# Patient Record
Sex: MALE | Race: WHITE | URBAN - METROPOLITAN AREA
[De-identification: names, ages, dates, MRNs, and addresses within clinical notes are randomized per-mention and may not be internally consistent; named-entity substitution may affect disease eponyms.]

---

## 2018-06-05 ENCOUNTER — MEDICAL CORRESPONDENCE (OUTPATIENT)
Dept: HEALTH INFORMATION MANAGEMENT | Facility: CLINIC | Age: 68
End: 2018-06-05

## 2018-06-05 ENCOUNTER — TRANSFERRED RECORDS (OUTPATIENT)
Dept: HEALTH INFORMATION MANAGEMENT | Facility: CLINIC | Age: 68
End: 2018-06-05

## 2018-06-07 ENCOUNTER — TELEPHONE (OUTPATIENT)
Dept: DERMATOLOGY | Facility: CLINIC | Age: 68
End: 2018-06-07

## 2018-06-07 NOTE — TELEPHONE ENCOUNTER
[facility] Call Center    Phone Message    May a detailed message be left on voicemail: yes    Reason for Call: Other: Pt is being referred by Dr. 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 It is not adequately controlled and they are referring for further treatment options. This was sent as an urgen referral and I am not able to facilitate an urgent appt. I have fwd the referral/notes to the clinic for review. Please contact the pt with an appt. Thanks.     Action Taken: Message routed to:  [facility] ([facility] Derm

## 2018-06-07 NOTE — TELEPHONE ENCOUNTER
I called and spoke with Benji. He is going to give us a call tomorrow 6/8/18 to get scheduled for a appointment. Ok to schedule with Dr. Perez later on in the week. Referral is in my box.    MICHELLE Hamm

## 2018-06-11 ENCOUNTER — TELEPHONE (OUTPATIENT)
Dept: DERMATOLOGY | Facility: CLINIC | Age: 68
End: 2018-06-11

## 2018-06-11 NOTE — TELEPHONE ENCOUNTER
Dermatology Pre-visit Call:    Reason for visit : Sores on feet      Was the patient referred: Yes    If the patient was referred, are records obtained: Yes    Has the patient seen a dermatologist in the past: Yes     Patient Reminders Given:  --Please, make sure you bring an updated list of your medications.   --Plan on being in our facility for approximately one hour, this includes the registration process, office visit, education and check-out process.  If you are having a procedure, more time may be required.     --If you are having a procedure, please, present 15 minutes early.  --Location reviewed.   --If you need to cancel or reschedule, call XXXX  --We look forward to seeing you in Dermatology Clinic.

## 2018-06-15 ENCOUNTER — RADIANT APPOINTMENT (OUTPATIENT)
Dept: GENERAL RADIOLOGY | Facility: CLINIC | Age: 68
End: 2018-06-15
Attending: PHYSICIAN ASSISTANT
Payer: COMMERCIAL

## 2018-06-15 ENCOUNTER — OFFICE VISIT (OUTPATIENT)
Dept: DERMATOLOGY | Facility: CLINIC | Age: 68
End: 2018-06-15
Payer: COMMERCIAL

## 2018-06-15 DIAGNOSIS — D86.3 CUTANEOUS SARCOIDOSIS (H): Primary | ICD-10-CM

## 2018-06-15 DIAGNOSIS — R21 RASH: ICD-10-CM

## 2018-06-15 DIAGNOSIS — D86.3 CUTANEOUS SARCOIDOSIS (H): ICD-10-CM

## 2018-06-15 LAB
ALBUMIN SERPL-MCNC: 4 G/DL (ref 3.4–5)
ALBUMIN UR-MCNC: NEGATIVE MG/DL
ALP SERPL-CCNC: 58 U/L (ref 40–150)
ALT SERPL W P-5'-P-CCNC: 25 U/L (ref 0–70)
ANION GAP SERPL CALCULATED.3IONS-SCNC: 8 MMOL/L (ref 3–14)
APPEARANCE UR: ABNORMAL
AST SERPL W P-5'-P-CCNC: 16 U/L (ref 0–45)
BACTERIA #/AREA URNS HPF: ABNORMAL /HPF
BASOPHILS # BLD AUTO: 0 10E9/L (ref 0–0.2)
BASOPHILS NFR BLD AUTO: 0.3 %
BILIRUB SERPL-MCNC: 0.7 MG/DL (ref 0.2–1.3)
BILIRUB UR QL STRIP: NEGATIVE
BUN SERPL-MCNC: 28 MG/DL (ref 7–30)
CALCIUM SERPL-MCNC: 8.9 MG/DL (ref 8.5–10.1)
CHLORIDE SERPL-SCNC: 101 MMOL/L (ref 94–109)
CO2 SERPL-SCNC: 28 MMOL/L (ref 20–32)
COLOR UR AUTO: YELLOW
CREAT SERPL-MCNC: 1.07 MG/DL (ref 0.66–1.25)
DIFFERENTIAL METHOD BLD: ABNORMAL
EOSINOPHIL # BLD AUTO: 0 10E9/L (ref 0–0.7)
EOSINOPHIL NFR BLD AUTO: 0.1 %
ERYTHROCYTE [DISTWIDTH] IN BLOOD BY AUTOMATED COUNT: 13.8 % (ref 10–15)
GFR SERPL CREATININE-BSD FRML MDRD: 69 ML/MIN/1.7M2
GLUCOSE SERPL-MCNC: 140 MG/DL (ref 70–99)
GLUCOSE UR STRIP-MCNC: NEGATIVE MG/DL
HCT VFR BLD AUTO: 44.3 % (ref 40–53)
HGB BLD-MCNC: 14.4 G/DL (ref 13.3–17.7)
HGB UR QL STRIP: NEGATIVE
IMM GRANULOCYTES # BLD: 0.2 10E9/L (ref 0–0.4)
IMM GRANULOCYTES NFR BLD: 1.7 %
KETONES UR STRIP-MCNC: NEGATIVE MG/DL
LEUKOCYTE ESTERASE UR QL STRIP: NEGATIVE
LYMPHOCYTES # BLD AUTO: 0.6 10E9/L (ref 0.8–5.3)
LYMPHOCYTES NFR BLD AUTO: 6.9 %
MCH RBC QN AUTO: 31.6 PG (ref 26.5–33)
MCHC RBC AUTO-ENTMCNC: 32.5 G/DL (ref 31.5–36.5)
MCV RBC AUTO: 97 FL (ref 78–100)
MONOCYTES # BLD AUTO: 0.4 10E9/L (ref 0–1.3)
MONOCYTES NFR BLD AUTO: 4.3 %
MUCOUS THREADS #/AREA URNS LPF: PRESENT /LPF
NEUTROPHILS # BLD AUTO: 7.9 10E9/L (ref 1.6–8.3)
NEUTROPHILS NFR BLD AUTO: 86.7 %
NITRATE UR QL: NEGATIVE
NRBC # BLD AUTO: 0 10*3/UL
NRBC BLD AUTO-RTO: 0 /100
PH UR STRIP: 5 PH (ref 5–7)
PLATELET # BLD AUTO: 210 10E9/L (ref 150–450)
POTASSIUM SERPL-SCNC: 4.1 MMOL/L (ref 3.4–5.3)
PROT SERPL-MCNC: 7.7 G/DL (ref 6.8–8.8)
RBC # BLD AUTO: 4.55 10E12/L (ref 4.4–5.9)
RBC #/AREA URNS AUTO: 2 /HPF (ref 0–2)
SODIUM SERPL-SCNC: 137 MMOL/L (ref 133–144)
SOURCE: ABNORMAL
SP GR UR STRIP: 1.02 (ref 1–1.03)
SPERM #/AREA URNS HPF: PRESENT /HPF
UROBILINOGEN UR STRIP-MCNC: 0 MG/DL (ref 0–2)
WBC # BLD AUTO: 9.1 10E9/L (ref 4–11)
WBC #/AREA URNS AUTO: 2 /HPF (ref 0–5)

## 2018-06-15 RX ORDER — TRIAMCINOLONE ACETONIDE 1 MG/G
CREAM TOPICAL
COMMUNITY
Start: 2018-01-24 | End: 2019-02-15

## 2018-06-15 RX ORDER — PREDNISONE 5 MG/1
15 TABLET ORAL
COMMUNITY
Start: 2018-05-29 | End: 2018-08-21

## 2018-06-15 RX ORDER — NITROGLYCERIN 0.4 MG/1
0.4 TABLET SUBLINGUAL
COMMUNITY
Start: 2017-07-11

## 2018-06-15 RX ORDER — TACROLIMUS 1 MG/G
OINTMENT TOPICAL 2 TIMES DAILY
Qty: 60 G | Refills: 0 | Status: SHIPPED | OUTPATIENT
Start: 2018-06-15 | End: 2018-07-20

## 2018-06-15 RX ORDER — LISINOPRIL 10 MG/1
TABLET ORAL
COMMUNITY
Start: 2018-03-21

## 2018-06-15 RX ORDER — SODIUM PHOSPHATE,MONO-DIBASIC 19G-7G/118
2 ENEMA (ML) RECTAL
COMMUNITY
Start: 2014-12-17

## 2018-06-15 RX ORDER — FOLIC ACID 1 MG/1
TABLET ORAL
COMMUNITY
Start: 2017-09-15 | End: 2019-07-30

## 2018-06-15 RX ORDER — LIDOCAINE HYDROCHLORIDE AND EPINEPHRINE 10; 10 MG/ML; UG/ML
3 INJECTION, SOLUTION INFILTRATION; PERINEURAL ONCE
Qty: 3 ML | Refills: 0 | OUTPATIENT
Start: 2018-06-15 | End: 2019-02-15

## 2018-06-15 RX ORDER — METOPROLOL TARTRATE 25 MG/1
TABLET, FILM COATED ORAL
COMMUNITY
Start: 2018-05-25

## 2018-06-15 RX ORDER — METHOTREXATE 2.5 MG/1
TABLET ORAL
COMMUNITY
Start: 2018-05-09 | End: 2018-08-21

## 2018-06-15 RX ORDER — BETAMETHASONE DIPROPIONATE 0.05 %
OINTMENT (GRAM) TOPICAL 2 TIMES DAILY
Qty: 50 G | Refills: 3 | Status: SHIPPED | OUTPATIENT
Start: 2018-06-15 | End: 2018-08-21

## 2018-06-15 ASSESSMENT — PAIN SCALES - GENERAL
PAINLEVEL: NO PAIN (0)
PAINLEVEL: MILD PAIN (2)

## 2018-06-15 NOTE — NURSING NOTE
Lidocaine 1-1:276656 % injection   2mL once for one use, starting 6/15/2018 ending 6/15/2018,  2mL disp, R-0, injection  Injected by Shani Marshall LPN

## 2018-06-15 NOTE — NURSING NOTE
Dermatology Rooming Note    Benji Fields's goals for this visit include:   Chief Complaint   Patient presents with     Derm Problem     Richie is here today to be seen for sores on his feet- been there for about a year.      Swathi Bull MA

## 2018-06-15 NOTE — MR AVS SNAPSHOT
After Visit Summary   6/15/2018    Benji Fields    MRN: [id number]           Patient Information     Date Of Birth          1950        Visit Information        Provider Department      6/15/2018 8:00 AM Jagruti Montoya PA-C [facility] Dermatology        Today's Diagnoses     Cutaneous sarcoidosis    -  1    Rash          Care Instructions    Wound Care After a Biopsy    What is a skin biopsy?  A skin biopsy allows the doctor to examine a very small piece of tissue under the microscope to determine the diagnosis and the best treatment for the skin condition. A local anesthetic (numbing medicine)  is injected with a very small needle into the skin area to be tested. A small piece of skin is taken from the area. Sometimes a suture (stitch) is used.     What are the risks of a skin biopsy?  I will experience scar, bleeding, swelling, pain, crusting and redness. I may experience incomplete removal or recurrence. Risks of this procedure are excessive bleeding, bruising, infection, nerve damage, numbness, thick (hypertrophic or keloidal) scar and non-diagnostic biopsy.    How should I care for my wound for the first 24 hours?    Keep the wound dry and covered for 24 hours    If it bleeds, hold direct pressure on the area for 15 minutes. If bleeding does not stop then go to the emergency room    Avoid strenuous exercise the first 1-2 days or as your doctor instructs you    How should I care for the wound after 24 hours?    After 24 hours, remove the bandage    You may bathe or shower as normal    If you had a scalp biopsy, you can shampoo as usual and can use shower water to clean the biopsy site daily    Clean the wound twice a day with gentle soap and water    Do not scrub, be gentle    Apply white petroleum/Vaseline after cleaning the wound with a cotton swab or a clean finger, and keep the site covered with a Bandaid /bandage. Bandages are not necessary with a scalp biopsy    If you are unable to  cover the site with a Bandaid /bandage, re-apply ointment 2-3 times a day to keep the site moist. Moisture will help with healing    Avoid strenuous activity for first 1-2 days    Avoid lakes, rivers, pools, and oceans until the stitches are removed or the site is healed    How do I clean my wound?    Wash hands thoroughly with soap or use hand  before all wound care    Clean the wound with gentle soap and water    Apply white petroleum/Vaseline  to wound after it is clean    Replace the Bandaid /bandage to keep the wound covered for the first few days or as instructed by your doctor    If you had a scalp biopsy, warm shower water to the area on a daily basis should suffice    What should I use to clean my wound?     Cotton-tipped applicators (Qtips )    White petroleum jelly (Vaseline ). Use a clean new container and use Q-tips to apply.    Bandaids   as needed    Gentle soap     How should I care for my wound long term?    Do not get your wound dirty    Keep up with wound care for one week or until the area is healed.    A small scab will form and fall off by itself when the area is completely healed. The area will be red and will become pink in color as it heals. Sun protection is very important for how your scar will turn out. Sunscreen with an SPF 30 or greater is recommended once the area is healed.    If you have stitches, stitches need to be removed in  days. You may return to our clinic for this or you may have it done locally at your doctor s office.    You should have some soreness but it should be mild and slowly go away over several days. Talk to your doctor about using tylenol for pain,    When should I call my doctor?  If you have increased:     Pain or swelling    Pus or drainage (clear or slightly yellow drainage is ok)    Temperature over 100F    Spreading redness or warmth around wound    When will I hear about my results?  The biopsy results can take 2-3 weeks to come back. The clinic  will call you with the results, send you a [facility] message, or have you schedule a follow-up clinic or phone time to discuss the results. Contact our clinics if you do not hear from us in 3 weeks.     Who should I call with questions?    [facility]: [phone]     [facility]: [phone]    For urgent needs outside of business hours call the [facility] at [phone] and ask for the dermatology resident on call              Follow-ups after your visit        Follow-up notes from your care team     Return in about 4 weeks (around 7/13/2018).      Your next 10 appointments already scheduled     Jul 20, 2018  7:45 AM CDT   (Arrive by 7:30 AM)   Return Visit with Jagruti Montoya PA-C   [facility] ([facility])    [address] [place] [id number] [phone]              Future tests that were ordered for you today     Open Future Orders        Priority Expected Expires Ordered    Alkaline phosphatase Routine  6/15/2019 6/15/2018    Calcium Routine  6/15/2019 6/15/2018    CBC with platelets differential Routine  6/15/2019 6/15/2018    Comprehensive metabolic panel Routine  6/15/2019 6/15/2018    Routine UA with microscopic - No culture Routine  6/15/2019 6/15/2018    X-ray Chest 2 vws* Routine 6/15/2018 6/15/2019 6/15/2018            Who to contact     Please call your clinic at [phone] to:    Ask questions about your health    Make or cancel appointments    Discuss your medicines    Learn about your test results    Speak to your doctor            Additional Information About Your Visit        [facility] Information     The [employer] gives you secure access to your electronic health record. If you see a primary care provider, you can also send messages to your care team and make appointments. If you have questions, please call your primary care clinic.  If you do not have a primary care  provider, please call [phone] and they will assist you.      N-1-1 is an electronic gateway that provides easy, online access to your medical records. With [employer], you can request a clinic appointment, read your test results, renew a prescription or communicate with your care team.     To access your existing account, please contact your [facility] or call [phone] for assistance.        Care EveryWhere ID     This is your Care EveryWhere ID. This could be used by other organizations to access your [facility] medical records  [id number]         Blood Pressure from Last 3 Encounters:   10/06/15 (!) 145/107    Weight from Last 3 Encounters:   10/06/15 102.1 kg (225 lb)              We Performed the Following     Angiotensin Converting Enzyme, Serum ([facility])     BIOPSY SKIN/SUBQ/MUC MEM, SINGLE LESION     Dermatological path order and indications     EKG 12-lead, tracing only     Pulmonary Function Test     TB INTRADERMAL TEST          Today's Medication Changes          These changes are accurate as of 6/15/18  9:03 AM.  If you have any questions, ask your nurse or doctor.               Start taking these medicines.        Dose/Directions    betamethasone dipropionate 0.05 % ointment   Commonly known as:  DIPROSONE   Used for:  Cutaneous sarcoidosis   Started by:  Jagruti Montoya PA-C        Apply topically 2 times daily   Quantity:  50 g   Refills:  3       lidocaine 1% with EPINEPHrine 1:100,000 1 %-1:811046 injection   Used for:  Rash   Started by:  Jagruti Montoya PA-C        Dose:  3 mL   Inject 3 mLs into the skin once for 1 dose   Quantity:  3 mL   Refills:  0       tacrolimus 0.1 % ointment   Commonly known as:  PROTOPIC   Used for:  Cutaneous sarcoidosis   Started by:  Jagruti Montoya PA-C        Apply topically 2 times daily   Quantity:  60 g   Refills:  0            Where to get your medicines      These medications were sent to [facility] - [place], [place] - [address]  [address] [place] [address] [address], [facility] [address]     Phone:  [phone]     betamethasone dipropionate 0.05 % ointment    tacrolimus 0.1 % ointment         Some of these will need a paper prescription and others can be bought over the counter.  Ask your nurse if you have questions.     You don't need a prescription for these medications     lidocaine 1% with EPINEPHrine 1:100,000 1 %-1:298754 injection                Primary Care Provider Office Phone # Fax Peña Lobo [phone]       [facility] [address]  [facility] [address]        Equal Access to Services     AGAPITO AVINA : Venus Hitchcock, radha casper, gwen veras, blade anderson. So [facility] [phone].    ATENCIÓN: Si habla español, tiene a casey disposición servicios gratuitos de asistencia lingüística. Llame al [phone].    We comply with applicable federal civil rights laws and [place] laws. We do not discriminate on the basis of race, color, national origin, age, disability, sex, sexual orientation, or gender identity.            Thank you!     Thank you for choosing [facility] DERMAT[facility]  for your care. Our goal is always to provide you with excellent care. Hearing back from our patients is one way we can continue to improve our services. Please take a few minutes to complete the written survey that you may receive in the mail after your visit with us. Thank you!             Your Updated Medication List - Protect others around you: Learn how to safely use, store and throw away your medicines at www.disposemymeds.org.          This list is accurate as of 6/15/18  9:03 AM.  Always use your most recent med list.                   Brand Name Dispense Instructions for use Diagnosis    betamethasone dipropionate 0.05 % ointment    DIPROSONE    50 g    Apply topically 2 times daily    Cutaneous sarcoidosis       Calcium  Carb-Cholecalciferol 600-800 MG-UNIT Tabs      Take 1 tablet orally once daily.        FISH OIL PO      Take 1 capsule by mouth        folic acid 1 MG tablet    FOLVITE          glucosamine-chondroitin 500-400 MG Caps per capsule      Take 2 capsules by mouth        lidocaine 1% with EPINEPHrine 1:100,000 1 %-1:641641 injection     3 mL    Inject 3 mLs into the skin once for 1 dose    Rash       lisinopril 10 MG tablet    PRINIVIL/ZESTRIL          methotrexate sodium 2.5 MG Tabs           metoprolol tartrate 25 MG tablet    LOPRESSOR          nitroGLYcerin 0.4 MG sublingual tablet    NITROSTAT     Place 0.4 mg under the tongue        omeprazole 20 MG CR capsule    priLOSEC          predniSONE 5 MG tablet    DELTASONE     Take 15 mg by mouth        SIMVASTATIN PO           tacrolimus 0.1 % ointment    PROTOPIC    60 g    Apply topically 2 times daily    Cutaneous sarcoidosis       triamcinolone 0.1 % cream    KENALOG

## 2018-06-15 NOTE — LETTER
6/15/2018     RE: Benji Fields  [address] [facility] [address]     Dear Colleague,    Thank you for referring your patient, Benji Fields, to the [facility] DERMATOLOGY at [facility]. Please see a copy of my visit note below.    [facility] Dermatology Note      Dermatology Problem List:  1.Rash - suspect cutaneous sarcoidosis, will r/o systemic involvement  -MTX 20mg po weekly, folic acid 1mg daily, prednisone 15mg po daily, tacrolimus 0.1% ointment QOD, betamethasone 0.05% ointment QOD  -previous tx: ILK, topical TAC cream.     CC:   Chief Complaint   Patient presents with     Derm Problem     Leonk is here today to be seen for sores on his feet- been there for about a year.          Encounter Date: Art 15, 2018    History of Present Illness:  Mr. Benji Fields is a 67 year old male who presents as a referral to the dermatology clinic. He was seen most recently by her PCP Nura Blankenship DO in [place], [place]. He presents for further management of cutaneous sarcoidosis. The sores on the skin began about 2 years ago. He has had the spot on the stomach about 2 years and then the sores on the feet started about 1 year ago. He also has a spot on his forearms and on the back of both lower legs. Per referral notes, the dx of cutaneous sarcoid was made about 6mo ago via biopsy. However no bx records were included in the records. He has been treating with MTX 20mg po weekly, prednisone 15mg daily and folic acid 1mg daily. He also has ILK in the past without success and has been occasionally using topical TAC cream without success. At one point he was on a higher dose of oral prednisone he says and this worked well to clear up the red patches and sores on his feet. However when his PCP started to taper him off the medication the patches came back.     Per patient and patient's wife who is present today, he does not believe additional testing to rule out systemic sarcoidosis  was ever performed. He most recently had a follow-up with his cardiologist regarding an MI he had in 2016. He also tells me about 1 year ago he developed a clot/stroke within the eye. He says they checked his carotid arteries, but that was it. He does not report SOB or other difficulty breathing and states otherwise feels well. The patient denies painful, itching, tingling or bleeding lesions unless otherwise noted.    They are planning to go to [place] in September and are hoping to have this well controlled by then as they plan to do a lot of walking.     Past Medical History:   There is no problem list on file for this patient.    Past Medical History:   Diagnosis Date     High cholesterol      Hypertension      Past Surgical History:   Procedure Laterality Date     HERNIA REPAIR         Social History:  .     Family History:  Not obtained today.    Medications:  Current Outpatient Prescriptions   Medication Sig Dispense Refill     Calcium Carb-Cholecalciferol 600-800 MG-UNIT TABS Take 1 tablet orally once daily.       folic acid (FOLVITE) 1 MG tablet        glucosamine-chondroitin 500-400 MG CAPS per capsule Take 2 capsules by mouth       lisinopril (PRINIVIL/ZESTRIL) 10 MG tablet        methotrexate sodium 2.5 MG TABS        metoprolol tartrate (LOPRESSOR) 25 MG tablet        nitroGLYcerin (NITROSTAT) 0.4 MG sublingual tablet Place 0.4 mg under the tongue       Omega-3 Fatty Acids (FISH OIL PO) Take 1 capsule by mouth       omeprazole (PRILOSEC) 20 MG CR capsule        predniSONE (DELTASONE) 5 MG tablet Take 15 mg by mouth       SIMVASTATIN PO        triamcinolone (KENALOG) 0.1 % cream        emtricitabine-tenofovir (TRUVADA) 200-300 MG per tablet Take 1 tablet by mouth daily for 25 days 25 tablet 0     raltegravir (ISENTRESS) 400 MG tablet Take 1 tablet (400 mg) by mouth 2 times daily for 25 days 50 tablet 0     No Known Allergies      Review of Systems:  -Skin/Heme New Pt: The patient denies frequent  sun exposure. The patient denies excessive scarring or problems healing except as per HPI.   -Constitutional: The patient denies fatigue, fevers, chills, unintended weight loss, and night sweats.  -Eyes: no vision change, diplopia or red eyes   -Cardiovascular: no chest pain, palpitations, or pain with walking, no orthopnea or PND   -Respiratory: no dyspnea, cough, shortness of breath or wheezing   -GI: no nausea, vomiting, diarrhea or constipation, no abdominal pain   -: no change in urine, no dysuria or hematuria, no sexual dysfunction   -Musculoskeletal: no joint or muscle pain or swelling   -Neuro: no loss of strength or sensation, no numbness or tingling, no tremor, no dizziness, no headache   -Endo: no polyuria or polydipsia, no temperature intolerance   -Heme/Lymph: no concerning bumps, no bleeding problems   -Skin: As above in HPI. No additional skin concerns.    Physical exam:  Vitals: There were no vitals taken for this visit.  GEN: This is a well developed, well-nourished male in no acute distress, in a pleasant mood.    SKIN: Sun-exposed skin, which includes the head/face, neck, both arms, digits, and/or nails was examined.   -4x4 erythematous plaque with slight scale on the left lateral foot, a similar appearing plaque is noted on the medial and plantar surface of the right foot, this one does have a small area of hemorrhagic crusting centrally. He has a 2cm erythematous plaque on the lower central abdomen and a small 8mm pink macule with scale on the left elbow.   -No other lesions of concern on areas examined.     Impression/Plan:  1. Rash - ddx: likely cutaneous sarcoidosis as patient had bx about 6mo ago with this, (but does not have records available today) patient would like to repeat bx today. Need to rule out systemic disease as it does not appear this was done thus far.    UA, CXR, ECG, TB Quanteferon gold, PFTs, CBC, CMP, serum calcium, Alk phos, and serum angiotensin converting enzyme  ordered today to r/o systemic involvement of sarcoid.  After discussion of benefits and risks including but not limited to bleeding, infection, scar, incomplete removal, recurrence, and non-diagnostic biopsy, written consent and photographs were obtained. The area was cleaned with isopropyl alcohol. 3mL of 1% lidocaine with epinephrine was injected to obtain adequate anesthesia of the lesion on the left lateral foot. A 4mm punch biopsy was performed.  Gel foam was utilized to approximate the epidermal edges.  White petroleum jelly/VaselineTM and a bandage was applied to the wound.  Explicit verbal and written wound care instructions were provided.  The patient left the Dermatology Clinic in good condition.    Start tacrolimus 0.1% ointment alternating with betamethasone diproprionate 0.05% ointment BID on the bilateral feet.    Hold triamcinolone 0.1% cream for now as this has not been helpful    Continue with MTX 20mg weekly for now as prescribed by PCP -plan will be to d/c this once we establish dx    Continue folic acid 1mg daily for now as prescribed by PCP - plan will be to d/c this once we establish dx    Continue prednisone 15mg daily for now as prescribed by PCP - plan will be to d/c this once we establish dx    Ideally, would like for him to be on Plaquenil 200-400mg daily instead, but will await further testing prior to initiating therapy    Other treatment options: ILK (not helpful in he past), minocycline, isotretinoin, and infliximab/adalimumab      CC Dr. Mcmillan on close of this encounter.  Follow-up in 1 month, earlier for new or changing lesions.       Staff Involved:  Staff Only  All risks, benefits and alternatives were discussed with patient.  Patient is in agreement and understands the assessment and plan.  All questions were answered.    Jagruti Montoya PA-C  [facility]: Phone: [phone], Fax: [phone]

## 2018-06-15 NOTE — PATIENT INSTRUCTIONS

## 2018-06-15 NOTE — PROGRESS NOTES
[facility] Dermatology Note      Dermatology Problem List:  1.Rash - suspect cutaneous sarcoidosis, will r/o systemic involvement  -MTX 20mg po weekly, folic acid 1mg daily, prednisone 15mg po daily, tacrolimus 0.1% ointment QOD, betamethasone 0.05% ointment QOD  -previous tx: ILK, topical TAC cream.     CC:   Chief Complaint   Patient presents with     Derm Problem     Richie is here today to be seen for sores on his feet- been there for about a year.          Encounter Date: Art 15, 2018    History of Present Illness:  Mr. Benji Fields is a 67 year old male who presents as a referral to the dermatology clinic. He was seen most recently by her PCP Nura Blankenship DO in [place], [place]. He presents for further management of cutaneous sarcoidosis. The sores on the skin began about 2 years ago. He has had the spot on the stomach about 2 years and then the sores on the feet started about 1 year ago. He also has a spot on his forearms and on the back of both lower legs. Per referral notes, the dx of cutaneous sarcoid was made about 6mo ago via biopsy. However no bx records were included in the records. He has been treating with MTX 20mg po weekly, prednisone 15mg daily and folic acid 1mg daily. He also has ILK in the past without success and has been occasionally using topical TAC cream without success. At one point he was on a higher dose of oral prednisone he says and this worked well to clear up the red patches and sores on his feet. However when his PCP started to taper him off the medication the patches came back.     Per patient and patient's wife who is present today, he does not believe additional testing to rule out systemic sarcoidosis was ever performed. He most recently had a follow-up with his cardiologist regarding an MI he had in 2016. He also tells me about 1 year ago he developed a clot/stroke within the eye. He says they checked his carotid arteries, but that was it. He does not report  SOB or other difficulty breathing and states otherwise feels well. The patient denies painful, itching, tingling or bleeding lesions unless otherwise noted.    They are planning to go to [place] in September and are hoping to have this well controlled by then as they plan to do a lot of walking.     Past Medical History:   There is no problem list on file for this patient.    Past Medical History:   Diagnosis Date     High cholesterol      Hypertension      Past Surgical History:   Procedure Laterality Date     HERNIA REPAIR         Social History:  .     Family History:  Not obtained today.    Medications:  Current Outpatient Prescriptions   Medication Sig Dispense Refill     Calcium Carb-Cholecalciferol 600-800 MG-UNIT TABS Take 1 tablet orally once daily.       folic acid (FOLVITE) 1 MG tablet        glucosamine-chondroitin 500-400 MG CAPS per capsule Take 2 capsules by mouth       lisinopril (PRINIVIL/ZESTRIL) 10 MG tablet        methotrexate sodium 2.5 MG TABS        metoprolol tartrate (LOPRESSOR) 25 MG tablet        nitroGLYcerin (NITROSTAT) 0.4 MG sublingual tablet Place 0.4 mg under the tongue       Omega-3 Fatty Acids (FISH OIL PO) Take 1 capsule by mouth       omeprazole (PRILOSEC) 20 MG CR capsule        predniSONE (DELTASONE) 5 MG tablet Take 15 mg by mouth       SIMVASTATIN PO        triamcinolone (KENALOG) 0.1 % cream        emtricitabine-tenofovir (TRUVADA) 200-300 MG per tablet Take 1 tablet by mouth daily for 25 days 25 tablet 0     raltegravir (ISENTRESS) 400 MG tablet Take 1 tablet (400 mg) by mouth 2 times daily for 25 days 50 tablet 0     No Known Allergies      Review of Systems:  -Skin/Heme New Pt: The patient denies frequent sun exposure. The patient denies excessive scarring or problems healing except as per HPI.   -Constitutional: The patient denies fatigue, fevers, chills, unintended weight loss, and night sweats.  -Eyes: no vision change, diplopia or red eyes   -Cardiovascular: no  chest pain, palpitations, or pain with walking, no orthopnea or PND   -Respiratory: no dyspnea, cough, shortness of breath or wheezing   -GI: no nausea, vomiting, diarrhea or constipation, no abdominal pain   -: no change in urine, no dysuria or hematuria, no sexual dysfunction   -Musculoskeletal: no joint or muscle pain or swelling   -Neuro: no loss of strength or sensation, no numbness or tingling, no tremor, no dizziness, no headache   -Endo: no polyuria or polydipsia, no temperature intolerance   -Heme/Lymph: no concerning bumps, no bleeding problems   -Skin: As above in HPI. No additional skin concerns.    Physical exam:  Vitals: There were no vitals taken for this visit.  GEN: This is a well developed, well-nourished male in no acute distress, in a pleasant mood.    SKIN: Sun-exposed skin, which includes the head/face, neck, both arms, digits, and/or nails was examined.   -4x4 erythematous plaque with slight scale on the left lateral foot, a similar appearing plaque is noted on the medial and plantar surface of the right foot, this one does have a small area of hemorrhagic crusting centrally. He has a 2cm erythematous plaque on the lower central abdomen and a small 8mm pink macule with scale on the left elbow.   -No other lesions of concern on areas examined.     Impression/Plan:  1. Rash - ddx: likely cutaneous sarcoidosis as patient had bx about 6mo ago with this, (but does not have records available today) patient would like to repeat bx today. Need to rule out systemic disease as it does not appear this was done thus far.    UA, CXR, ECG, TB Quanteferon gold, PFTs, CBC, CMP, serum calcium, Alk phos, and serum angiotensin converting enzyme ordered today to r/o systemic involvement of sarcoid.  After discussion of benefits and risks including but not limited to bleeding, infection, scar, incomplete removal, recurrence, and non-diagnostic biopsy, written consent and photographs were obtained. The area was  cleaned with isopropyl alcohol. 3mL of 1% lidocaine with epinephrine was injected to obtain adequate anesthesia of the lesion on the left lateral foot. A 4mm punch biopsy was performed.  Gel foam was utilized to approximate the epidermal edges.  White petroleum jelly/VaselineTM and a bandage was applied to the wound.  Explicit verbal and written wound care instructions were provided.  The patient left the Dermatology Clinic in good condition.    Start tacrolimus 0.1% ointment alternating with betamethasone diproprionate 0.05% ointment BID on the bilateral feet.    Hold triamcinolone 0.1% cream for now as this has not been helpful    Continue with MTX 20mg weekly for now as prescribed by PCP -plan will be to d/c this once we establish dx    Continue folic acid 1mg daily for now as prescribed by PCP - plan will be to d/c this once we establish dx    Continue prednisone 15mg daily for now as prescribed by PCP - plan will be to d/c this once we establish dx    Ideally, would like for him to be on Plaquenil 200-400mg daily instead, but will await further testing prior to initiating therapy    Other treatment options: ILK (not helpful in he past), minocycline, isotretinoin, and infliximab/adalimumab      CC Dr. Mcmillan on close of this encounter.  Follow-up in 1 month, earlier for new or changing lesions.       Staff Involved:  Staff Only  All risks, benefits and alternatives were discussed with patient.  Patient is in agreement and understands the assessment and plan.  All questions were answered.    Jagruti Montoya PA-C  [facility]: Phone: [phone], Fax: [phone]

## 2018-06-25 LAB — COPATH REPORT: NORMAL

## 2018-06-27 ENCOUNTER — TELEPHONE (OUTPATIENT)
Dept: DERMATOLOGY | Facility: CLINIC | Age: 68
End: 2018-06-27

## 2018-06-27 NOTE — TELEPHONE ENCOUNTER
Discussed path, labs, UA and chest X-ray results in detail with patient via telephone. We can likely eliminate systemic sarcoidosis. His H&E was still somewhat nonspecific. Cutaneous sarcoid as well as deep tissue fungal infection/mycobacerium is in differential. Patient to follow-up in 1mo for possible repeat bx, and then he should follow-up with MDs - possibly Michaela or Dr. Chu.

## 2018-07-20 ENCOUNTER — OFFICE VISIT (OUTPATIENT)
Dept: DERMATOLOGY | Facility: CLINIC | Age: 68
End: 2018-07-20
Payer: COMMERCIAL

## 2018-07-20 DIAGNOSIS — D86.3 CUTANEOUS SARCOIDOSIS (H): ICD-10-CM

## 2018-07-20 DIAGNOSIS — R21 RASH: Primary | ICD-10-CM

## 2018-07-20 RX ORDER — LIDOCAINE HYDROCHLORIDE AND EPINEPHRINE 10; 10 MG/ML; UG/ML
1 INJECTION, SOLUTION INFILTRATION; PERINEURAL ONCE
Qty: 1 ML | Refills: 0 | OUTPATIENT
Start: 2018-07-20 | End: 2019-02-15

## 2018-07-20 RX ORDER — TACROLIMUS 1 MG/G
OINTMENT TOPICAL 2 TIMES DAILY
Qty: 60 G | Refills: 0 | Status: SHIPPED | OUTPATIENT
Start: 2018-07-20 | End: 2019-02-15

## 2018-07-20 ASSESSMENT — PAIN SCALES - GENERAL
PAINLEVEL: NO PAIN (0)
PAINLEVEL: NO PAIN (0)

## 2018-07-20 NOTE — NURSING NOTE
Dermatology Rooming Note    Benji Fields's goals for this visit include:   Chief Complaint   Patient presents with     Derm Problem     Benji is here today for a follow up- notes some improvement.      Swathi Bull MA

## 2018-07-20 NOTE — MR AVS SNAPSHOT
After Visit Summary   7/20/2018    Benji Fields    MRN: [id number]           Patient Information     Date Of Birth          1950        Visit Information        Provider Department      7/20/2018 7:45 AM Jagruti Montoya PA-C [facility]        Today's Diagnoses     Rash    -  1    Cutaneous sarcoidosis          Care Instructions    You should be alternating betamethasone ointment (steroid) with tacrolimus ointment (non-steroid).    For now, hold/don't use - the triamcinolone cream  - mostly because it was not effective, but also because I gave you betamethasone which is another stronger steroid.        Wound Care After a Biopsy    What is a skin biopsy?  A skin biopsy allows the doctor to examine a very small piece of tissue under the microscope to determine the diagnosis and the best treatment for the skin condition. A local anesthetic (numbing medicine)  is injected with a very small needle into the skin area to be tested. A small piece of skin is taken from the area. Sometimes a suture (stitch) is used.     What are the risks of a skin biopsy?  I will experience scar, bleeding, swelling, pain, crusting and redness. I may experience incomplete removal or recurrence. Risks of this procedure are excessive bleeding, bruising, infection, nerve damage, numbness, thick (hypertrophic or keloidal) scar and non-diagnostic biopsy.    How should I care for my wound for the first 24 hours?    Keep the wound dry and covered for 24 hours    If it bleeds, hold direct pressure on the area for 15 minutes. If bleeding does not stop then go to the emergency room    Avoid strenuous exercise the first 1-2 days or as your doctor instructs you    How should I care for the wound after 24 hours?    After 24 hours, remove the bandage    You may bathe or shower as normal    If you had a scalp biopsy, you can shampoo as usual and can use shower water to clean the biopsy site daily    Clean the wound twice a day  with gentle soap and water    Do not scrub, be gentle    Apply white petroleum/Vaseline after cleaning the wound with a cotton swab or a clean finger, and keep the site covered with a Bandaid /bandage. Bandages are not necessary with a scalp biopsy    If you are unable to cover the site with a Bandaid /bandage, re-apply ointment 2-3 times a day to keep the site moist. Moisture will help with healing    Avoid strenuous activity for first 1-2 days    Avoid lakes, rivers, pools, and oceans until the stitches are removed or the site is healed    How do I clean my wound?    Wash hands thoroughly with soap or use hand  before all wound care    Clean the wound with gentle soap and water    Apply white petroleum/Vaseline  to wound after it is clean    Replace the Bandaid /bandage to keep the wound covered for the first few days or as instructed by your doctor    If you had a scalp biopsy, warm shower water to the area on a daily basis should suffice    What should I use to clean my wound?     Cotton-tipped applicators (Qtips )    White petroleum jelly (Vaseline ). Use a clean new container and use Q-tips to apply.    Bandaids   as needed    Gentle soap     How should I care for my wound long term?    Do not get your wound dirty    Keep up with wound care for one week or until the area is healed.    A small scab will form and fall off by itself when the area is completely healed. The area will be red and will become pink in color as it heals. Sun protection is very important for how your scar will turn out. Sunscreen with an SPF 30 or greater is recommended once the area is healed.    You should have some soreness but it should be mild and slowly go away over several days. Talk to your doctor about using tylenol for pain,    When should I call my doctor?  If you have increased:     Pain or swelling    Pus or drainage (clear or slightly yellow drainage is ok)    Temperature over 100F    Spreading redness or warmth  around wound    When will I hear about my results?  The biopsy results can take 2-3 weeks to come back. The clinic will call you with the results, send you a [place] message, or have you schedule a follow-up clinic or phone time to discuss the results. Contact our clinics if you do not hear from us in 3 weeks.     Who should I call with questions?    [facility]: [phone]     [facility]: [phone]    For urgent needs outside of business hours call the [facility] at [phone] and ask for the dermatology resident on call              Follow-ups after your visit        Who to contact     Please call your clinic at [phone] to:    Ask questions about your health    Make or cancel appointments    Discuss your medicines    Learn about your test results    Speak to your doctor            Additional Information About Your Visit        MyChart Information     [employer] gives you secure access to your electronic health record. If you see a primary care provider, you can also send messages to your care team and make appointments. If you have questions, please call your primary care clinic.  If you do not have a primary care provider, please call [phone] and they will assist you.      [employer] is an electronic gateway that provides easy, online access to your medical records. With [employer], you can request a clinic appointment, read your test results, renew a prescription or communicate with your care team.     To access your existing account, please contact your [facility] or call [phone] for assistance.        Care EveryWhere ID     This is your Care EveryWhere ID. This could be used by other organizations to access your [place] medical records  [id number]         Blood Pressure from Last 3 Encounters:   10/06/15 (!) 145/107    Weight from Last 3 Encounters:   10/06/15 102.1 kg (225 lb)              We  Performed the Following     BIOPSY SKIN/SUBQ/MUC MEM, SINGLE LESION     Fungus Culture, non-blood     Tissue Culture Aerobic Bacterial          Today's Medication Changes          These changes are accurate as of 7/20/18  8:19 AM.  If you have any questions, ask your nurse or doctor.               Start taking these medicines.        Dose/Directions    lidocaine 1% with EPINEPHrine 1:100,000 1 %-1:223453 injection   Used for:  Rash   Started by:  Jagruti Montoya PA-C        Dose:  1 mL   Inject 1 mL into the skin once for 1 dose   Quantity:  1 mL   Refills:  0            Where to get your medicines      These medications were sent to [place], [place] - [address]  [address], [facility] [address]    Hours:  TRANSPLANT PHONE NUMBER [phone] Phone:  [phone]     tacrolimus 0.1 % ointment         Some of these will need a paper prescription and others can be bought over the counter.  Ask your nurse if you have questions.     You don't need a prescription for these medications     lidocaine 1% with EPINEPHrine 1:100,000 1 %-1:698677 injection                Primary Care Provider Office Phone # Fax #    Sammy Lobo [phone]       [facility] [address]  [facility] [address]        Equal Access to Services     AGAPITO AVINA AH: Hadii carly ku hadasho Soomaali, waaxda luqadaha, qaybta kaalmada adeegyada, waxay keiko anderson. So [place] [phone].    ATENCIÓN: Si habla español, tiene a casey disposición servicios gratuitos de asistencia lingüística. Llame al [phone].    We comply with applicable federal civil rights laws and [place] laws. We do not discriminate on the basis of race, color, national origin, age, disability, sex, sexual orientation, or gender identity.            Thank you!     Thank you for choosing [facility]  for your care. Our goal is always to provide you with  excellent care. Hearing back from our patients is one way we can continue to improve our services. Please take a few minutes to complete the written survey that you may receive in the mail after your visit with us. Thank you!             Your Updated Medication List - Protect others around you: Learn how to safely use, store and throw away your medicines at www.disposemymeds.org.          This list is accurate as of 7/20/18  8:19 AM.  Always use your most recent med list.                   Brand Name Dispense Instructions for use Diagnosis    betamethasone dipropionate 0.05 % ointment    DIPROSONE    50 g    Apply topically 2 times daily    Cutaneous sarcoidosis       Calcium Carb-Cholecalciferol 600-800 MG-UNIT Tabs      Take 1 tablet orally once daily.        FISH OIL PO      Take 1 capsule by mouth        folic acid 1 MG tablet    FOLVITE          glucosamine-chondroitin 500-400 MG Caps per capsule      Take 2 capsules by mouth        lidocaine 1% with EPINEPHrine 1:100,000 1 %-1:224498 injection     1 mL    Inject 1 mL into the skin once for 1 dose    Rash       lisinopril 10 MG tablet    PRINIVIL/ZESTRIL          methotrexate sodium 2.5 MG Tabs           metoprolol tartrate 25 MG tablet    LOPRESSOR          nitroGLYcerin 0.4 MG sublingual tablet    NITROSTAT     Place 0.4 mg under the tongue        omeprazole 20 MG CR capsule    priLOSEC          predniSONE 5 MG tablet    DELTASONE     Take 15 mg by mouth        SIMVASTATIN PO           tacrolimus 0.1 % ointment    PROTOPIC    60 g    Apply topically 2 times daily    Cutaneous sarcoidosis       triamcinolone 0.1 % cream    KENALOG

## 2018-07-20 NOTE — PATIENT INSTRUCTIONS
You should be alternating betamethasone ointment (steroid) with tacrolimus ointment (non-steroid).    For now, hold/don't use - the triamcinolone cream  - mostly because it was not effective, but also because I gave you betamethasone which is another stronger steroid.        Wound Care After a Biopsy    What is a skin biopsy?  A skin biopsy allows the doctor to examine a very small piece of tissue under the microscope to determine the diagnosis and the best treatment for the skin condition. A local anesthetic (numbing medicine)  is injected with a very small needle into the skin area to be tested. A small piece of skin is taken from the area. Sometimes a suture (stitch) is used.     What are the risks of a skin biopsy?  I will experience scar, bleeding, swelling, pain, crusting and redness. I may experience incomplete removal or recurrence. Risks of this procedure are excessive bleeding, bruising, infection, nerve damage, numbness, thick (hypertrophic or keloidal) scar and non-diagnostic biopsy.    How should I care for my wound for the first 24 hours?    Keep the wound dry and covered for 24 hours    If it bleeds, hold direct pressure on the area for 15 minutes. If bleeding does not stop then go to the emergency room    Avoid strenuous exercise the first 1-2 days or as your doctor instructs you    How should I care for the wound after 24 hours?    After 24 hours, remove the bandage    You may bathe or shower as normal    If you had a scalp biopsy, you can shampoo as usual and can use shower water to clean the biopsy site daily    Clean the wound twice a day with gentle soap and water    Do not scrub, be gentle    Apply white petroleum/Vaseline after cleaning the wound with a cotton swab or a clean finger, and keep the site covered with a Bandaid /bandage. Bandages are not necessary with a scalp biopsy    If you are unable to cover the site with a Bandaid /bandage, re-apply ointment 2-3 times a day to keep the site  moist. Moisture will help with healing    Avoid strenuous activity for first 1-2 days    Avoid lakes, rivers, pools, and oceans until the stitches are removed or the site is healed    How do I clean my wound?    Wash hands thoroughly with soap or use hand  before all wound care    Clean the wound with gentle soap and water    Apply white petroleum/Vaseline  to wound after it is clean    Replace the Bandaid /bandage to keep the wound covered for the first few days or as instructed by your doctor    If you had a scalp biopsy, warm shower water to the area on a daily basis should suffice    What should I use to clean my wound?     Cotton-tipped applicators (Qtips )    White petroleum jelly (Vaseline ). Use a clean new container and use Q-tips to apply.    Bandaids   as needed    Gentle soap     How should I care for my wound long term?    Do not get your wound dirty    Keep up with wound care for one week or until the area is healed.    A small scab will form and fall off by itself when the area is completely healed. The area will be red and will become pink in color as it heals. Sun protection is very important for how your scar will turn out. Sunscreen with an SPF 30 or greater is recommended once the area is healed.    You should have some soreness but it should be mild and slowly go away over several days. Talk to your doctor about using tylenol for pain,    When should I call my doctor?  If you have increased:     Pain or swelling    Pus or drainage (clear or slightly yellow drainage is ok)    Temperature over 100F    Spreading redness or warmth around wound    When will I hear about my results?  The biopsy results can take 2-3 weeks to come back. The clinic will call you with the results, send you a [place] message, or have you schedule a follow-up clinic or phone time to discuss the results. Contact our clinics if you do not hear from us in 3 weeks.     Who should I call with questions?    [facility]  [facility]: [phone]     [facility]: [phone]    For urgent needs outside of business hours call the [facility] at [phone] and ask for the dermatology resident on call

## 2018-07-20 NOTE — NURSING NOTE
Lidocaine 1%  1mL once for one use, starting 7/20/2018 ending 7/20/2018,  2mL disp, R-0, injection  Injected by Swatih Bull MA

## 2018-07-20 NOTE — LETTER
7/20/2018       RE: Benji Fields  [address]  [facility] [address]     Dear Colleague,    Thank you for referring your patient, Benji Fields, to the [facility] DERMATOLOGY at [facility]. Please see a copy of my visit note below.    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     CC:   Chief Complaint   Patient presents with     Derm Problem     Benji is here today for a follow up- notes some improvement.          Encounter Date: Jul 20, 2018    History of Present Illness:  Mr. Benji Fields is a 68 year old male who returns to the dermatology clinic. He was last seen by me 6/15/18 for his rash and we did a fairly extensive work-up to r/o systemic sarcoidosis. A repeat biopsy was also performed and it did show granulomatous tissue, however they could not r/o a deep fungal/mycobacterium infection. Essentially, the work-up was negative for systemic involvement. Prior to that, he was seen by his PCP Nura Blankenship DO in [place], [place] who diagnosed him with cutaneous sarcoid via bx. He presents for further management of cutaneous sarcoidosis. The sores on the skin began about 2 years ago. He has had the spot on the stomach about 2 years and then the sores on the feet started about 1 year ago. He also has a spot on his forearms and on the back of both lower legs. Per referral notes, the dx of cutaneous sarcoid was made about 6mo ago via biopsy. However no bx records were included in the records. He has been treating with MTX 20mg po weekly, prednisone 15mg daily and folic acid 1mg daily. He also has ILK in the past without success and has been occasionally using topical TAC cream without success. At one point he was  on a higher dose of oral prednisone he says and this worked well to clear up the red patches and sores on his feet. However when his PCP started to taper him off the medication the patches came back.     He recently had a follow-up with his cardiologist regarding an MI he had in 2016. He also tells me about 1 year ago he developed a clot/stroke within the eye. He says they checked his carotid arteries, but that was it. He does not report SOB or other difficulty breathing and states otherwise feels well. The patient denies painful, itching, tingling or bleeding lesions unless otherwise noted.    They are planning to go to [place] in September and are hoping to have this well controlled by then as they plan to do a lot of walking.     Past Medical History:   There is no problem list on file for this patient.    Past Medical History:   Diagnosis Date     High cholesterol      Hypertension      Past Surgical History:   Procedure Laterality Date     HERNIA REPAIR         Social History:  .     Family History:  Not obtained today.    Medications:  Current Outpatient Prescriptions   Medication Sig Dispense Refill     betamethasone dipropionate (DIPROSONE) 0.05 % ointment Apply topically 2 times daily 50 g 3     Calcium Carb-Cholecalciferol 600-800 MG-UNIT TABS Take 1 tablet orally once daily.       folic acid (FOLVITE) 1 MG tablet        glucosamine-chondroitin 500-400 MG CAPS per capsule Take 2 capsules by mouth       lisinopril (PRINIVIL/ZESTRIL) 10 MG tablet        methotrexate sodium 2.5 MG TABS        metoprolol tartrate (LOPRESSOR) 25 MG tablet        nitroGLYcerin (NITROSTAT) 0.4 MG sublingual tablet Place 0.4 mg under the tongue       Omega-3 Fatty Acids (FISH OIL PO) Take 1 capsule by mouth       omeprazole (PRILOSEC) 20 MG CR capsule        predniSONE (DELTASONE) 5 MG tablet Take 15 mg by mouth       SIMVASTATIN PO        tacrolimus (PROTOPIC) 0.1 % ointment Apply topically 2 times daily 60 g 0      triamcinolone (KENALOG) 0.1 % cream        No Known Allergies      Review of Systems:  -Skin/Heme New Pt: The patient denies frequent sun exposure. The patient denies excessive scarring or problems healing except as per HPI.   -Constitutional: The patient denies fatigue, fevers, chills, unintended weight loss, and night sweats.  -Eyes: no vision change, diplopia or red eyes   -Cardiovascular: no chest pain, palpitations, or pain with walking, no orthopnea or PND   -Respiratory: no dyspnea, cough, shortness of breath or wheezing   -GI: no nausea, vomiting, diarrhea or constipation, no abdominal pain   -: no change in urine, no dysuria or hematuria, no sexual dysfunction   -Musculoskeletal: right foot is tender on the plantar surface, however this is not over an area of rash, otherwise no joint or muscle pain or swelling   -Neuro: no loss of strength or sensation, no numbness or tingling, no tremor, no dizziness, no headache   -Endo: no polyuria or polydipsia, no temperature intolerance   -Heme/Lymph: no concerning bumps, no bleeding problems   -Skin: As above in HPI. No additional skin concerns.    Physical exam:  Vitals: There were no vitals taken for this visit.  GEN: This is a well developed, well-nourished male in no acute distress, in a pleasant mood.    SKIN: Sun-exposed skin, which includes the head/face, neck, both arms, digits, and/or nails was examined. His feet and abdomen were also examined.  -4x4 erythematous plaque with slight scale on the left lateral foot, a similar appearing plaque is noted on the medial and plantar surface of the right foot,both appear slightly improved from the past visit. He has a 2cm erythematous plaque on the lower central abdomen and a small 8mm pink macule with scale on the left elbow.   -No other lesions of concern on areas examined.     Impression/Plan:  1. Rash - ddx: likely cutaneous sarcoidosis as patient had bx about 6mo ago with this, (but does not have records  available today) patient would like to repeat bx today. Systemic disease largely ruled out - however following our bx at the [facility], will need to rule out deep fungal infection - such as mycobacterium    Reviewed UA, CXR, ECG, TB Quanteferon gold, PFTs, CBC, CMP, serum calcium, Alk phos, and serum angiotensin converting enzyme with patient and discussed that this likely does not support a systemic sarcoidosis dx  Punch bx for tissue culture - After discussion of benefits and risks including but not limited to bleeding, infection, scar, incomplete removal, recurrence, and non-diagnostic biopsy, written consent and photographs were obtained. The area was cleaned with isopropyl alcohol. 3mL of 1% lidocaine with epinephrine was injected to obtain adequate anesthesia of the lesion on the right medial foot. A 4mm punch biopsy was performed.  Gel foam was utilized to approximate the epidermal edges.  White petroleum jelly/VaselineTM and a bandage was applied to the wound.  Explicit verbal and written wound care instructions were provided.  The patient left the Dermatology Clinic in good condition.    Continue tacrolimus 0.1% ointment alternating with betamethasone diproprionate 0.05% ointment BID on the bilateral feet.    Hold triamcinolone 0.1% cream for now as this has not been helpful    Continue with MTX 20mg weekly for now as prescribed by PCP -plan will be to d/c this once we establish dx    Continue folic acid 1mg daily for now as prescribed by PCP - plan will be to d/c this once we establish dx    Continue prednisone 15mg daily for now as prescribed by PCP - plan will be to d/c this once we establish dx    Ideally, would like for him to be on Plaquenil 200-400mg daily instead, but will await further testing prior to initiating therapy    Other treatment options: ILK (not helpful in he past), minocycline, isotretinoin, and infliximab/adalimumab      CC Dr. Mcmillan on close of this encounter.  Follow-up in 1 month,  earlier for new or changing lesions.       Staff Involved:  Staff Only  All risks, benefits and alternatives were discussed with patient.  Patient is in agreement and understands the assessment and plan.  All questions were answered.    Jagruti Montoya PA-C  [facility]: Phone: [phone], Fax: [phone]

## 2018-07-20 NOTE — PROGRESS NOTES
[facility] Dermatology Note      Dermatology Problem List:  1.Rash - suspect cutaneous sarcoidosis, will r/o systemic involvement  -MTX 20mg po weekly, folic acid 1mg daily, prednisone 15mg po daily, tacrolimus 0.1% ointment QOD, betamethasone 0.05% ointment QOD  -previous tx: ILK, topical TAC cream.     CC:   Chief Complaint   Patient presents with     Derm Problem     Benji is here today for a follow up- notes some improvement.          Encounter Date: Jul 20, 2018    History of Present Illness:  Mr. Benji Fields is a 68 year old male who returns to the dermatology clinic. He was last seen by me 6/15/18 for his rash and we did a fairly extensive work-up to r/o systemic sarcoidosis. A repeat biopsy was also performed and it did show granulomatous tissue, however they could not r/o a deep fungal/mycobacterium infection. Essentially, the work-up was negative for systemic involvement. Prior to that, he was seen by his PCP Nura Blankenship DO in [place], [place] who diagnosed him with cutaneous sarcoid via bx. He presents for further management of cutaneous sarcoidosis. The sores on the skin began about 2 years ago. He has had the spot on the stomach about 2 years and then the sores on the feet started about 1 year ago. He also has a spot on his forearms and on the back of both lower legs. Per referral notes, the dx of cutaneous sarcoid was made about 6mo ago via biopsy. However no bx records were included in the records. He has been treating with MTX 20mg po weekly, prednisone 15mg daily and folic acid 1mg daily. He also has ILK in the past without success and has been occasionally using topical TAC cream without success. At one point he was on a higher dose of oral prednisone he says and this worked well to clear up the red patches and sores on his feet. However when his PCP started to taper him off the medication the patches came back.     He recently had a follow-up with his cardiologist regarding an  MI he had in 2016. He also tells me about 1 year ago he developed a clot/stroke within the eye. He says they checked his carotid arteries, but that was it. He does not report SOB or other difficulty breathing and states otherwise feels well. The patient denies painful, itching, tingling or bleeding lesions unless otherwise noted.    They are planning to go to [place] in September and are hoping to have this well controlled by then as they plan to do a lot of walking.     Past Medical History:   There is no problem list on file for this patient.    Past Medical History:   Diagnosis Date     High cholesterol      Hypertension      Past Surgical History:   Procedure Laterality Date     HERNIA REPAIR         Social History:  .     Family History:  Not obtained today.    Medications:  Current Outpatient Prescriptions   Medication Sig Dispense Refill     betamethasone dipropionate (DIPROSONE) 0.05 % ointment Apply topically 2 times daily 50 g 3     Calcium Carb-Cholecalciferol 600-800 MG-UNIT TABS Take 1 tablet orally once daily.       folic acid (FOLVITE) 1 MG tablet        glucosamine-chondroitin 500-400 MG CAPS per capsule Take 2 capsules by mouth       lisinopril (PRINIVIL/ZESTRIL) 10 MG tablet        methotrexate sodium 2.5 MG TABS        metoprolol tartrate (LOPRESSOR) 25 MG tablet        nitroGLYcerin (NITROSTAT) 0.4 MG sublingual tablet Place 0.4 mg under the tongue       Omega-3 Fatty Acids (FISH OIL PO) Take 1 capsule by mouth       omeprazole (PRILOSEC) 20 MG CR capsule        predniSONE (DELTASONE) 5 MG tablet Take 15 mg by mouth       SIMVASTATIN PO        tacrolimus (PROTOPIC) 0.1 % ointment Apply topically 2 times daily 60 g 0     triamcinolone (KENALOG) 0.1 % cream        No Known Allergies      Review of Systems:  -Skin/Heme New Pt: The patient denies frequent sun exposure. The patient denies excessive scarring or problems healing except as per HPI.   -Constitutional: The patient denies fatigue,  fevers, chills, unintended weight loss, and night sweats.  -Eyes: no vision change, diplopia or red eyes   -Cardiovascular: no chest pain, palpitations, or pain with walking, no orthopnea or PND   -Respiratory: no dyspnea, cough, shortness of breath or wheezing   -GI: no nausea, vomiting, diarrhea or constipation, no abdominal pain   -: no change in urine, no dysuria or hematuria, no sexual dysfunction   -Musculoskeletal: right foot is tender on the plantar surface, however this is not over an area of rash, otherwise no joint or muscle pain or swelling   -Neuro: no loss of strength or sensation, no numbness or tingling, no tremor, no dizziness, no headache   -Endo: no polyuria or polydipsia, no temperature intolerance   -Heme/Lymph: no concerning bumps, no bleeding problems   -Skin: As above in HPI. No additional skin concerns.    Physical exam:  Vitals: There were no vitals taken for this visit.  GEN: This is a well developed, well-nourished male in no acute distress, in a pleasant mood.    SKIN: Sun-exposed skin, which includes the head/face, neck, both arms, digits, and/or nails was examined. His feet and abdomen were also examined.  -4x4 erythematous plaque with slight scale on the left lateral foot, a similar appearing plaque is noted on the medial and plantar surface of the right foot,both appear slightly improved from the past visit. He has a 2cm erythematous plaque on the lower central abdomen and a small 8mm pink macule with scale on the left elbow.   -No other lesions of concern on areas examined.     Impression/Plan:  1. Rash - ddx: likely cutaneous sarcoidosis as patient had bx about 6mo ago with this, (but does not have records available today) patient would like to repeat bx today. Systemic disease largely ruled out - however following our bx at the [facility] [place], will need to rule out deep fungal infection - such as mycobacterium    Reviewed UA, CXR, ECG, TB Quanteferon gold, PFTs, CBC, CMP, serum calcium,  Alk phos, and serum angiotensin converting enzyme with patient and discussed that this likely does not support a systemic sarcoidosis dx  Punch bx for tissue culture - After discussion of benefits and risks including but not limited to bleeding, infection, scar, incomplete removal, recurrence, and non-diagnostic biopsy, written consent and photographs were obtained. The area was cleaned with isopropyl alcohol. 3mL of 1% lidocaine with epinephrine was injected to obtain adequate anesthesia of the lesion on the right medial foot. A 4mm punch biopsy was performed.  Gel foam was utilized to approximate the epidermal edges.  White petroleum jelly/VaselineTM and a bandage was applied to the wound.  Explicit verbal and written wound care instructions were provided.  The patient left the Dermatology Clinic in good condition.    Continue tacrolimus 0.1% ointment alternating with betamethasone diproprionate 0.05% ointment BID on the bilateral feet.    Hold triamcinolone 0.1% cream for now as this has not been helpful    Continue with MTX 20mg weekly for now as prescribed by PCP -plan will be to d/c this once we establish dx    Continue folic acid 1mg daily for now as prescribed by PCP - plan will be to d/c this once we establish dx    Continue prednisone 15mg daily for now as prescribed by PCP - plan will be to d/c this once we establish dx    Ideally, would like for him to be on Plaquenil 200-400mg daily instead, but will await further testing prior to initiating therapy    Other treatment options: ILK (not helpful in he past), minocycline, isotretinoin, and infliximab/adalimumab      CC Dr. Mcmillan on close of this encounter.  Follow-up in 1 month, earlier for new or changing lesions.       Staff Involved:  Staff Only  All risks, benefits and alternatives were discussed with patient.  Patient is in agreement and understands the assessment and plan.  All questions were answered.    Jagruti Montoya PA-C  [facility]  [facility]: Phone: [phone], Fax: [phone]

## 2018-07-22 LAB
BACTERIA SPEC CULT: ABNORMAL
SPECIMEN SOURCE: ABNORMAL

## 2018-08-17 LAB
FUNGUS SPEC CULT: NORMAL
SPECIMEN SOURCE: NORMAL

## 2018-08-21 ENCOUNTER — OFFICE VISIT (OUTPATIENT)
Dept: DERMATOLOGY | Facility: CLINIC | Age: 68
End: 2018-08-21
Payer: COMMERCIAL

## 2018-08-21 ENCOUNTER — TELEPHONE (OUTPATIENT)
Dept: DERMATOLOGY | Facility: CLINIC | Age: 68
End: 2018-08-21

## 2018-08-21 DIAGNOSIS — D86.3 CUTANEOUS SARCOIDOSIS (H): ICD-10-CM

## 2018-08-21 RX ORDER — MINOCYCLINE HYDROCHLORIDE 50 MG/1
100 CAPSULE ORAL 2 TIMES DAILY
Qty: 60 CAPSULE | Refills: 3 | Status: SHIPPED | OUTPATIENT
Start: 2018-08-21 | End: 2018-11-02

## 2018-08-21 RX ORDER — CLOPIDOGREL BISULFATE 75 MG/1
75 TABLET ORAL
COMMUNITY
Start: 2018-07-05 | End: 2019-02-15

## 2018-08-21 RX ORDER — PREDNISONE 5 MG/1
15 TABLET ORAL DAILY
Qty: 90 TABLET | Refills: 3 | Status: SHIPPED | OUTPATIENT
Start: 2018-08-21 | End: 2018-08-29

## 2018-08-21 RX ORDER — BETAMETHASONE DIPROPIONATE 0.05 %
OINTMENT (GRAM) TOPICAL 2 TIMES DAILY
Qty: 50 G | Refills: 3 | Status: SHIPPED | OUTPATIENT
Start: 2018-08-21

## 2018-08-21 RX ORDER — METHOTREXATE 2.5 MG/1
20 TABLET ORAL WEEKLY
Qty: 32 TABLET | Refills: 2 | Status: SHIPPED | OUTPATIENT
Start: 2018-08-21 | End: 2018-12-03

## 2018-08-21 ASSESSMENT — PAIN SCALES - GENERAL
PAINLEVEL: NO PAIN (0)
PAINLEVEL: NO PAIN (0)

## 2018-08-21 NOTE — TELEPHONE ENCOUNTER
Spoke with [facility] regarding minocycline. Informed them that minocycline is being used to treat cutaneous sarcoidosis for its anti-inflammatory properties rather than its antibiotic effect.  He is not actively infected at this time. Will fax most recent note and this documentation to [facility] at [phone].

## 2018-08-21 NOTE — NURSING NOTE
Lidocaine1% injection   1.5mL once for one use, starting 8/21/2018 ending 8/21/2018,  2mL disp, R-0, injection  Injected by Dr. Gaitan

## 2018-08-21 NOTE — TELEPHONE ENCOUNTER
[facility] Call Center    Phone Message    May a detailed message be left on voicemail: yes    Reason for Call: Other: Patient called to see if he could get someone to call over to [facility] to give the OK for patient to start his antibiotic regimen on Friday, as he needs a cortisone shot, which is scheduled for Thursday and the clinic will not allow if he is supposed to be taking antibiotics. Please call [facility] at [phone]. Please call patient with an update.      Action Taken: Message routed to:  [facility] ([facility] derm

## 2018-08-21 NOTE — LETTER
8/21/2018       RE: Benji Fields  [address] [facility] [address]     Dear Colleague,    Thank you for referring your patient, Benji Fields, to the [facility] DERMATOLOGY at [facility]. Please see a copy of my visit note below.    [facility] Dermatology Note      Dermatology Problem List:  1.Rash - suspect cutaneous sarcoidosis without systemic involvement  -MTX 20mg po weekly, folic acid 1mg daily, prednisone 15mg po daily, minocycline 100 mg BID, betamethasone 0.05% ointment BID   -previous tx: ILK, topical TAC cream    CC:   Chief Complaint   Patient presents with     Derm Problem     Benji is here today for a 1 month follow up on his sarcoidosis. Benji notes- improvement.        Encounter Date: Aug 21, 2018    History of Present Illness:  Benji is a 68-yo man with a history of biopsy-supported cutaneous sarcoidosis who presents for one month follow-up. Since the last time we saw him, Benji endorses mild improvement in his disease with no new lesions or expansion of pre-existing lesions. He notes that his lesions no longer blister, and the affected areas on his arms appear to have disappeared altogether. He denies any associated symptoms including pain, itch and joint/muscle tenderness and states that his disease does not inhibit his activities in any way. He continues to take methotrexate 20 mg weekly, folic acid 1 mg daily and prednisone 15 mg prednisone. For topical agents he has been using diprosone 0.05% ointment twice a day and tacrolimus 0.1% ointment every other day to affected areas of his feet. He has noticed improvement particularly with application of the betamethasone diproprionate ointment.    Benji and his wife are planning to go to [place] in September for two weeks and are concerned about disease management while they are on vacation. His health has otherwise been good and unchanged from his last visit.       Past Medical History:    There is no problem list on file for this patient.    Past Medical History:   Diagnosis Date     High cholesterol      Hypertension      Past Surgical History:   Procedure Laterality Date     HERNIA REPAIR         Social History:  .     Family History:  Not obtained today.    Medications:  Current Outpatient Prescriptions   Medication Sig Dispense Refill     betamethasone dipropionate (DIPROSONE) 0.05 % ointment Apply topically 2 times daily 50 g 3     Calcium Carb-Cholecalciferol 600-800 MG-UNIT TABS Take 1 tablet orally once daily.       folic acid (FOLVITE) 1 MG tablet        glucosamine-chondroitin 500-400 MG CAPS per capsule Take 2 capsules by mouth       lisinopril (PRINIVIL/ZESTRIL) 10 MG tablet        methotrexate sodium 2.5 MG TABS        metoprolol tartrate (LOPRESSOR) 25 MG tablet        nitroGLYcerin (NITROSTAT) 0.4 MG sublingual tablet Place 0.4 mg under the tongue       Omega-3 Fatty Acids (FISH OIL PO) Take 1 capsule by mouth       omeprazole (PRILOSEC) 20 MG CR capsule        predniSONE (DELTASONE) 5 MG tablet Take 15 mg by mouth       SIMVASTATIN PO        tacrolimus (PROTOPIC) 0.1 % ointment Apply topically 2 times daily 60 g 0     triamcinolone (KENALOG) 0.1 % cream        clopidogrel (PLAVIX) 75 MG tablet Take 75 mg by mouth       No Known Allergies      Review of Systems:  -Constitutional: The patient denies fatigue, fevers, chills, unintended weight loss, and night sweats.  -Skin: As above in HPI. No additional skin concerns.    Physical exam:  Vitals: There were no vitals taken for this visit.  GEN: This is a well developed, well-nourished male in no acute distress, in a pleasant mood.    SKIN: Sun-exposed skin, which includes the head/face, neck, both arms, digits, and/or nails was examined. His feet and abdomen were also examined.  - large hyperkeratotic plaque on L lateral foot, with similar-appearing plaque noted on the medial surface of the right foot, with resolved underlying  erythema as compared to the last visit  - well-defined, faintly erythematous plaque with faint scale on posterior aspect of L ankle  - well-defined, mildly hyperpigmented patch on the lower central abdomen    - No other lesions of concern on areas examined.     Punch bx for tissue culture: After discussion of benefits and risks including but not limited to bleeding, infection, scar, incomplete removal, recurrence, and non-diagnostic biopsy, written consent and photographs were obtained. The area was cleaned with isopropyl alcohol. 1.5 mL of 1% lidocaine with epinephrine was injected to obtain adequate anesthesia of the lesion on the left lateral foot. A 4-mm punch biopsy was performed.  Gel foam was utilized to approximate the epidermal edges.  White petroleum jelly/VaselineTM and a bandage was applied to the wound.  Explicit verbal and written wound care instructions were provided.  The patient left the Dermatology Clinic in good condition.    Impression/Plan:  1. Recalcitrant eruption likely 2/2 cutaneous sarcoidosis (biopsy-supported from [place]) with low suspicion for systemic involvement (normal CBC, CMP, EKG, CXR, UA, TB Quant Gold, PFTs, CBC). Have not definitively ruled-out mycobacterial infection but less likely given improvement of disease on immunosuppression without antibiotic coverage. Patient reported blistering, but on detailed questioning it may have been an erosion/ulcer rather than a true vesicle/bulla  - Obtained 3mm punch specimen for AFB culture only (fungal and bacterial done at last visit along with H&E)   - Will hold on methotrexate labs today given last labs performed in June and > 3 month history of administration with plan for patient to pursue labs at site closer to home next month pre-vacation (CBC, BUN, Creatinine, LFTs)    Leaving for [place] next month, and concerned about flares while overseas. Therefore will hold plans until their return.  - Start minocycline 100mg BID (treats sarcoid,  covers atypical mycobacteria)  - Discontinue tacrolimus 0.1% ointment   - Continue betamethasone diproprionate 0.05% ointment BID to affected areas on BL feet  - Continue with MTX 20 mg weekly for now, with plan to discontinue in favor of alternative agent (Plaqenil vs. Humira if does not respond to minocycline)   - Continue folic acid 1 mg daily for now   - Continue prednisone 15 mg daily for now, with plan to wean off gradually post-vacation    CC Dr. Mcmillan on close of this encounter.    Follow-up on Tuesday post-vacation (October 9), earlier for new or changing lesions.     Staff Involved:  Evelina Lundberg, MS4 scribed on behalf of Dr. Gaitan.    Staff Physician:  I was present with the medical student who participated in the service and in the documentation of the note. I have verified the history and personally performed the physical exam and medical decision making. I agree with the assessment and plan of care as documented in the note.     The procedure(s) was(were) performed by myself.  Punch biopsy procedure note: After discussion of benefits and risks including but not limited to bleeding, infection, scar, incomplete removal, recurrence, and non-diagnostic biopsy, written consent and photographs were obtained. The area was cleaned with isopropyl alcohol. 3mL of 1% lidocaine with epinephrine was injected to obtain adequate anesthesia of the lesion on the foot. A 3 mm punch biopsy was performed.  The wound was packed with Gelfoam. White petroleum jelly/VaselineTM and a bandage was applied to the wound.  Explicit verbal and written wound care instructions were provided.  The patient left the Dermatology Clinic in good condition.                    Again, thank you for allowing me to participate in the care of your patient.      Sincerely,    Byron Gaitan MD

## 2018-08-21 NOTE — PROGRESS NOTES
[facility] Dermatology Note      Dermatology Problem List:  1.Rash - suspect cutaneous sarcoidosis without systemic involvement  -MTX 20mg po weekly, folic acid 1mg daily, prednisone 15mg po daily, minocycline 100 mg BID, betamethasone 0.05% ointment BID   -previous tx: ILK, topical TAC cream    CC:   Chief Complaint   Patient presents with     Derm Problem     Benji is here today for a 1 month follow up on his sarcoidosis. Benji notes- improvement.        Encounter Date: Aug 21, 2018    History of Present Illness:  Benji is a 68-yo man with a history of biopsy-supported cutaneous sarcoidosis who presents for one month follow-up. Since the last time we saw him, Benji endorses mild improvement in his disease with no new lesions or expansion of pre-existing lesions. He notes that his lesions no longer blister, and the affected areas on his arms appear to have disappeared altogether. He denies any associated symptoms including pain, itch and joint/muscle tenderness and states that his disease does not inhibit his activities in any way. He continues to take methotrexate 20 mg weekly, folic acid 1 mg daily and prednisone 15 mg prednisone. For topical agents he has been using diprosone 0.05% ointment twice a day and tacrolimus 0.1% ointment every other day to affected areas of his feet. He has noticed improvement particularly with application of the betamethasone diproprionate ointment.    Benji and his wife are planning to go to [place] in September for two weeks and are concerned about disease management while they are on vacation. His health has otherwise been good and unchanged from his last visit.       Past Medical History:   There is no problem list on file for this patient.    Past Medical History:   Diagnosis Date     High cholesterol      Hypertension      Past Surgical History:   Procedure Laterality Date     HERNIA REPAIR         Social History:  .     Family History:  Not  obtained today.    Medications:  Current Outpatient Prescriptions   Medication Sig Dispense Refill     betamethasone dipropionate (DIPROSONE) 0.05 % ointment Apply topically 2 times daily 50 g 3     Calcium Carb-Cholecalciferol 600-800 MG-UNIT TABS Take 1 tablet orally once daily.       folic acid (FOLVITE) 1 MG tablet        glucosamine-chondroitin 500-400 MG CAPS per capsule Take 2 capsules by mouth       lisinopril (PRINIVIL/ZESTRIL) 10 MG tablet        methotrexate sodium 2.5 MG TABS        metoprolol tartrate (LOPRESSOR) 25 MG tablet        nitroGLYcerin (NITROSTAT) 0.4 MG sublingual tablet Place 0.4 mg under the tongue       Omega-3 Fatty Acids (FISH OIL PO) Take 1 capsule by mouth       omeprazole (PRILOSEC) 20 MG CR capsule        predniSONE (DELTASONE) 5 MG tablet Take 15 mg by mouth       SIMVASTATIN PO        tacrolimus (PROTOPIC) 0.1 % ointment Apply topically 2 times daily 60 g 0     triamcinolone (KENALOG) 0.1 % cream        clopidogrel (PLAVIX) 75 MG tablet Take 75 mg by mouth       No Known Allergies      Review of Systems:  -Constitutional: The patient denies fatigue, fevers, chills, unintended weight loss, and night sweats.  -Skin: As above in HPI. No additional skin concerns.    Physical exam:  Vitals: There were no vitals taken for this visit.  GEN: This is a well developed, well-nourished male in no acute distress, in a pleasant mood.    SKIN: Sun-exposed skin, which includes the head/face, neck, both arms, digits, and/or nails was examined. His feet and abdomen were also examined.  - large hyperkeratotic plaque on L lateral foot, with similar-appearing plaque noted on the medial surface of the right foot, with resolved underlying erythema as compared to the last visit  - well-defined, faintly erythematous plaque with faint scale on posterior aspect of L ankle  - well-defined, mildly hyperpigmented patch on the lower central abdomen    - No other lesions of concern on areas examined.     Punch  bx for tissue culture: After discussion of benefits and risks including but not limited to bleeding, infection, scar, incomplete removal, recurrence, and non-diagnostic biopsy, written consent and photographs were obtained. The area was cleaned with isopropyl alcohol. 1.5 mL of 1% lidocaine with epinephrine was injected to obtain adequate anesthesia of the lesion on the left lateral foot. A 4-mm punch biopsy was performed.  Gel foam was utilized to approximate the epidermal edges.  White petroleum jelly/VaselineTM and a bandage was applied to the wound.  Explicit verbal and written wound care instructions were provided.  The patient left the Dermatology Clinic in good condition.    Impression/Plan:  1. Recalcitrant eruption likely 2/2 cutaneous sarcoidosis (biopsy-supported from [place]) with low suspicion for systemic involvement (normal CBC, CMP, EKG, CXR, UA, TB Quant Gold, PFTs, CBC). Have not definitively ruled-out mycobacterial infection but less likely given improvement of disease on immunosuppression without antibiotic coverage. Patient reported blistering, but on detailed questioning it may have been an erosion/ulcer rather than a true vesicle/bulla  - Obtained 3mm punch specimen for AFB culture only (fungal and bacterial done at last visit along with H&E)   - Will hold on methotrexate labs today given last labs performed in June and > 3 month history of administration with plan for patient to pursue labs at site closer to home next month pre-vacation (CBC, BUN, Creatinine, LFTs)    Leaving for [place] next month, and concerned about flares while overseas. Therefore will hold plans until their return.  - Start minocycline 100mg BID (treats sarcoid, covers atypical mycobacteria)  - Discontinue tacrolimus 0.1% ointment   - Continue betamethasone diproprionate 0.05% ointment BID to affected areas on BL feet  - Continue with MTX 20 mg weekly for now, with plan to discontinue in favor of alternative agent (Plaqenil  vs. Humira if does not respond to minocycline)   - Continue folic acid 1 mg daily for now   - Continue prednisone 15 mg daily for now, with plan to wean off gradually post-vacation    CC Dr. Mcmillan on close of this encounter.    Follow-up on Tuesday post-vacation (October 9), earlier for new or changing lesions.     Staff Involved:  Evelina Lundberg, MS4 scribed on behalf of Dr. Gaitan.    Staff Physician:  I was present with the medical student who participated in the service and in the documentation of the note. I have verified the history and personally performed the physical exam and medical decision making. I agree with the assessment and plan of care as documented in the note.     The procedure(s) was(were) performed by myself.  Punch biopsy procedure note: After discussion of benefits and risks including but not limited to bleeding, infection, scar, incomplete removal, recurrence, and non-diagnostic biopsy, written consent and photographs were obtained. The area was cleaned with isopropyl alcohol. 3mL of 1% lidocaine with epinephrine was injected to obtain adequate anesthesia of the lesion on the foot. A 3 mm punch biopsy was performed.  The wound was packed with Gelfoam. White petroleum jelly/VaselineTM and a bandage was applied to the wound.  Explicit verbal and written wound care instructions were provided.  The patient left the Dermatology Clinic in good condition.    Byron Gaitan MD  Staff Dermatologist and Dermatopathologist  , Department of Dermatology

## 2018-08-21 NOTE — NURSING NOTE
Dermatology Rooming Note    Benji Fields's goals for this visit include:   Chief Complaint   Patient presents with     Derm Problem     Benji is here today for a 1 month follow up on his sarcoidosis. Benji notes- improvement.      MICHELLE Hamm

## 2018-08-21 NOTE — MR AVS SNAPSHOT
After Visit Summary   8/21/2018    Benji Fields    MRN: [id number]           Patient Information     Date Of Birth          1950        Visit Information        Provider Department      8/21/2018 7:10 AM Byron Gaitan MD [facility] Dermatology        Today's Diagnoses     Cutaneous sarcoidosis          Care Instructions    Wound Care After a Biopsy    What is a skin biopsy?  A skin biopsy allows the doctor to examine a very small piece of tissue under the microscope to determine the diagnosis and the best treatment for the skin condition. A local anesthetic (numbing medicine)  is injected with a very small needle into the skin area to be tested. A small piece of skin is taken from the area. Sometimes a suture (stitch) is used.     What are the risks of a skin biopsy?  I will experience scar, bleeding, swelling, pain, crusting and redness. I may experience incomplete removal or recurrence. Risks of this procedure are excessive bleeding, bruising, infection, nerve damage, numbness, thick (hypertrophic or keloidal) scar and non-diagnostic biopsy.    How should I care for my wound for the first 24 hours?    Keep the wound dry and covered for 24 hours    If it bleeds, hold direct pressure on the area for 15 minutes. If bleeding does not stop then go to the emergency room    Avoid strenuous exercise the first 1-2 days or as your doctor instructs you    How should I care for the wound after 24 hours?    After 24 hours, remove the bandage    You may bathe or shower as normal    If you had a scalp biopsy, you can shampoo as usual and can use shower water to clean the biopsy site daily    Clean the wound twice a day with gentle soap and water    Do not scrub, be gentle    Apply white petroleum/Vaseline after cleaning the wound with a cotton swab or a clean finger, and keep the site covered with a Bandaid /bandage. Bandages are not necessary with a scalp biopsy    If you are unable to cover the site with  a Bandaid /bandage, re-apply ointment 2-3 times a day to keep the site moist. Moisture will help with healing    Avoid strenuous activity for first 1-2 days    Avoid lakes, rivers, pools, and oceans until the stitches are removed or the site is healed    How do I clean my wound?    Wash hands thoroughly with soap or use hand  before all wound care    Clean the wound with gentle soap and water    Apply white petroleum/Vaseline  to wound after it is clean    Replace the Bandaid /bandage to keep the wound covered for the first few days or as instructed by your doctor    If you had a scalp biopsy, warm shower water to the area on a daily basis should suffice    What should I use to clean my wound?     Cotton-tipped applicators (Qtips )    White petroleum jelly (Vaseline ). Use a clean new container and use Q-tips to apply.    Bandaids   as needed    Gentle soap     How should I care for my wound long term?    Do not get your wound dirty    Keep up with wound care for one week or until the area is healed.    A small scab will form and fall off by itself when the area is completely healed. The area will be red and will become pink in color as it heals. Sun protection is very important for how your scar will turn out. Sunscreen with an SPF 30 or greater is recommended once the area is healed.      You should have some soreness but it should be mild and slowly go away over several days. Talk to your doctor about using tylenol for pain,    When should I call my doctor?  If you have increased:     Pain or swelling    Pus or drainage (clear or slightly yellow drainage is ok)    Temperature over 100F    Spreading redness or warmth around wound    When will I hear about my results?  The biopsy results can take 2-3 weeks to come back. The clinic will call you with the results, send you a [employer] message, or have you schedule a follow-up clinic or phone time to discuss the results. Contact our clinics if you do not hear  from us in 3 weeks.     Who should I call with questions?    [facility]: [phone]     [facility]: [phone]    For urgent needs outside of business hours call the [facility] at [phone] and ask for the dermatology resident on call            Follow-ups after your visit        Your next 10 appointments already scheduled     Oct 09, 2018  7:40 AM CDT   (Arrive by 7:25 AM)   Return Visit with Byron Gaitan MD   [facility] Dermatology ([facility])    [address] [address] [phone]              Who to contact     Please call your clinic at [phone] to:    Ask questions about your health    Make or cancel appointments    Discuss your medicines    Learn about your test results    Speak to your doctor            Additional Information About Your Visit        [employer] Information     [employer] gives you secure access to your electronic health record. If you see a primary care provider, you can also send messages to your care team and make appointments. If you have questions, please call your primary care clinic.  If you do not have a primary care provider, please call [phone] and they will assist you.      [employer] is an electronic gateway that provides easy, online access to your medical records. With [employer], you can request a clinic appointment, read your test results, renew a prescription or communicate with your care team.     To access your existing account, please contact your [facility] or call [phone] for assistance.        Care EveryWhere ID     This is your Care EveryWhere ID. This could be used by other organizations to access your [facility] medical records  [id number]         Blood Pressure from Last 3 Encounters:   10/06/15 (!) 145/107    Weight from Last 3 Encounters:   10/06/15 102.1 kg (225 lb)              Today,  you had the following     No orders found for display       Primary Care Provider Office Phone # Fax #    Sammy Lobo [phone]       [facility] [address]  [facility] [address]        Equal Access to Services     AGAPITO AVINA : Hadii aad ku hadkendell Hitchcock, waaxda luqadaha, qaybta kaalmada aderishi, blade youngblood yecenia anderson. So [facility] [phone].    ATENCIÓN: Si habla español, tiene a caesy disposición servicios gratuitos de asistencia lingüística. Llame al [phone].    We comply with applicable federal civil rights laws and [place] laws. We do not discriminate on the basis of race, color, national origin, age, disability, sex, sexual orientation, or gender identity.            Thank you!     Thank you for choosing [facility] DERMATOLOGY  for your care. Our goal is always to provide you with excellent care. Hearing back from our patients is one way we can continue to improve our services. Please take a few minutes to complete the written survey that you may receive in the mail after your visit with us. Thank you!             Your Updated Medication List - Protect others around you: Learn how to safely use, store and throw away your medicines at www.disposemymeds.org.          This list is accurate as of 8/21/18  8:11 AM.  Always use your most recent med list.                   Brand Name Dispense Instructions for use Diagnosis    betamethasone dipropionate 0.05 % ointment    DIPROSONE    50 g    Apply topically 2 times daily    Cutaneous sarcoidosis       Calcium Carb-Cholecalciferol 600-800 MG-UNIT Tabs      Take 1 tablet orally once daily.        clopidogrel 75 MG tablet    PLAVIX     Take 75 mg by mouth        FISH OIL PO      Take 1 capsule by mouth        folic acid 1 MG tablet    FOLVITE          glucosamine-chondroitin 500-400 MG Caps per capsule      Take 2 capsules by mouth        lisinopril 10 MG tablet    PRINIVIL/ZESTRIL          methotrexate sodium 2.5  MG Tabs           metoprolol tartrate 25 MG tablet    LOPRESSOR          nitroGLYcerin 0.4 MG sublingual tablet    NITROSTAT     Place 0.4 mg under the tongue        omeprazole 20 MG CR capsule    priLOSEC          predniSONE 5 MG tablet    DELTASONE     Take 15 mg by mouth        SIMVASTATIN PO           tacrolimus 0.1 % ointment    PROTOPIC    60 g    Apply topically 2 times daily    Cutaneous sarcoidosis       triamcinolone 0.1 % cream    KENALOG

## 2018-08-21 NOTE — PATIENT INSTRUCTIONS

## 2018-08-21 NOTE — TELEPHONE ENCOUNTER
Called and clarified phone number with patient. Called [facility] at  [phone]      Chiquita Vasquez RN

## 2018-08-22 NOTE — TELEPHONE ENCOUNTER
This encounter and last office visit note faxed  to [facility] to [phone].     Chiquita Vasquez RN

## 2018-08-24 LAB
ACID FAST STN SPEC QL: NORMAL
ACID FAST STN SPEC QL: NORMAL
SPECIMEN SOURCE: NORMAL

## 2018-08-29 DIAGNOSIS — D86.3 CUTANEOUS SARCOIDOSIS (H): ICD-10-CM

## 2018-08-30 RX ORDER — PREDNISONE 5 MG/1
TABLET ORAL
Qty: 90 TABLET | Refills: 3 | Status: SHIPPED | OUTPATIENT
Start: 2018-08-30 | End: 2018-10-09

## 2018-10-09 ENCOUNTER — RADIANT APPOINTMENT (OUTPATIENT)
Dept: CT IMAGING | Facility: CLINIC | Age: 68
End: 2018-10-09
Attending: DERMATOLOGY
Payer: COMMERCIAL

## 2018-10-09 ENCOUNTER — RADIANT APPOINTMENT (OUTPATIENT)
Dept: CARDIOLOGY | Facility: CLINIC | Age: 68
End: 2018-10-09
Attending: DERMATOLOGY
Payer: COMMERCIAL

## 2018-10-09 ENCOUNTER — OFFICE VISIT (OUTPATIENT)
Dept: DERMATOLOGY | Facility: CLINIC | Age: 68
End: 2018-10-09
Payer: COMMERCIAL

## 2018-10-09 DIAGNOSIS — R06.02 SHORTNESS OF BREATH: ICD-10-CM

## 2018-10-09 DIAGNOSIS — D86.3 CUTANEOUS SARCOIDOSIS (H): ICD-10-CM

## 2018-10-09 DIAGNOSIS — Z79.631 METHOTREXATE, LONG TERM, CURRENT USE: ICD-10-CM

## 2018-10-09 DIAGNOSIS — Z79.631 METHOTREXATE, LONG TERM, CURRENT USE: Primary | ICD-10-CM

## 2018-10-09 LAB
ALBUMIN SERPL-MCNC: 3.5 G/DL (ref 3.4–5)
ALP SERPL-CCNC: 55 U/L (ref 40–150)
ALT SERPL W P-5'-P-CCNC: 35 U/L (ref 0–70)
ANION GAP SERPL CALCULATED.3IONS-SCNC: 8 MMOL/L (ref 3–14)
AST SERPL W P-5'-P-CCNC: 25 U/L (ref 0–45)
BASOPHILS # BLD AUTO: 0 10E9/L (ref 0–0.2)
BASOPHILS NFR BLD AUTO: 0.3 %
BILIRUB SERPL-MCNC: 0.6 MG/DL (ref 0.2–1.3)
BUN SERPL-MCNC: 28 MG/DL (ref 7–30)
CALCIUM SERPL-MCNC: 8.8 MG/DL (ref 8.5–10.1)
CHLORIDE SERPL-SCNC: 103 MMOL/L (ref 94–109)
CO2 SERPL-SCNC: 25 MMOL/L (ref 20–32)
CREAT SERPL-MCNC: 1.04 MG/DL (ref 0.66–1.25)
DIFFERENTIAL METHOD BLD: ABNORMAL
EOSINOPHIL # BLD AUTO: 0 10E9/L (ref 0–0.7)
EOSINOPHIL NFR BLD AUTO: 0.2 %
ERYTHROCYTE [DISTWIDTH] IN BLOOD BY AUTOMATED COUNT: 14.6 % (ref 10–15)
GFR SERPL CREATININE-BSD FRML MDRD: 71 ML/MIN/1.7M2
GLUCOSE SERPL-MCNC: 121 MG/DL (ref 70–99)
HCT VFR BLD AUTO: 40.7 % (ref 40–53)
HGB BLD-MCNC: 13.1 G/DL (ref 13.3–17.7)
IMM GRANULOCYTES # BLD: 0.1 10E9/L (ref 0–0.4)
IMM GRANULOCYTES NFR BLD: 0.9 %
LYMPHOCYTES # BLD AUTO: 0.4 10E9/L (ref 0.8–5.3)
LYMPHOCYTES NFR BLD AUTO: 7.2 %
MCH RBC QN AUTO: 30 PG (ref 26.5–33)
MCHC RBC AUTO-ENTMCNC: 32.2 G/DL (ref 31.5–36.5)
MCV RBC AUTO: 93 FL (ref 78–100)
MONOCYTES # BLD AUTO: 0.5 10E9/L (ref 0–1.3)
MONOCYTES NFR BLD AUTO: 7.9 %
NEUTROPHILS # BLD AUTO: 4.8 10E9/L (ref 1.6–8.3)
NEUTROPHILS NFR BLD AUTO: 83.5 %
NRBC # BLD AUTO: 0 10*3/UL
NRBC BLD AUTO-RTO: 0 /100
PLATELET # BLD AUTO: 207 10E9/L (ref 150–450)
POTASSIUM SERPL-SCNC: 4.7 MMOL/L (ref 3.4–5.3)
PROT SERPL-MCNC: 7 G/DL (ref 6.8–8.8)
RBC # BLD AUTO: 4.36 10E12/L (ref 4.4–5.9)
SODIUM SERPL-SCNC: 136 MMOL/L (ref 133–144)
WBC # BLD AUTO: 5.7 10E9/L (ref 4–11)

## 2018-10-09 RX ORDER — PREDNISONE 1 MG/1
TABLET ORAL
Qty: 150 TABLET | Refills: 0 | Status: SHIPPED | OUTPATIENT
Start: 2018-10-09 | End: 2019-02-15

## 2018-10-09 RX ORDER — PREDNISONE 5 MG/1
TABLET ORAL
Qty: 50 TABLET | Refills: 3 | Status: SHIPPED | OUTPATIENT
Start: 2018-10-09 | End: 2019-02-15

## 2018-10-09 RX ADMIN — Medication 5 ML: at 11:15

## 2018-10-09 ASSESSMENT — PAIN SCALES - GENERAL: PAINLEVEL: NO PAIN (0)

## 2018-10-09 NOTE — PROGRESS NOTES
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 He states that it has improved since his last visit.        History of Present Illness:  Mr. Benji Fields is a 68 year old male who presents as a follow-up for cutaneous sarcoidosis. The patient was last seen 8/21/18.  The patient has noted improvement of his skin in his bilateral feet.  He noted that he uses the clobetasol on these areas about once per day or once every other day.  He denies discomfort at these areas.  He noted soreness of his second distal MTP with driving.  He further noted worsening dyspnea over the past 2 years such that at present he is only able to ambulate 100-200 feet at a decent pace before becoming dyspneic.  He denies chest pain, but is noted significant back pain.    Past Medical History:   There is no problem list on file for this patient.    Past Medical History:   Diagnosis Date     High cholesterol      Hypertension      Past Surgical History:   Procedure Laterality Date     HERNIA REPAIR         Medications:  Current Outpatient Prescriptions   Medication Sig Dispense Refill     betamethasone dipropionate (DIPROSONE) 0.05 % ointment Apply topically 2 times daily 50 g 3     Calcium Carb-Cholecalciferol 600-800 MG-UNIT TABS Take 1 tablet orally once daily.       clopidogrel (PLAVIX) 75 MG tablet Take 75 mg by mouth       folic acid (FOLVITE) 1 MG tablet        glucosamine-chondroitin 500-400 MG CAPS per capsule Take 2 capsules by mouth       lisinopril (PRINIVIL/ZESTRIL) 10 MG tablet        methotrexate sodium 2.5 MG TABS Take 8  tablets (20 mg) by mouth once a week 32 tablet 2     metoprolol tartrate (LOPRESSOR) 25 MG tablet        minocycline (MINOCIN/DYNACIN) 50 MG capsule Take 2 capsules (100 mg) by mouth 2 times daily 60 capsule 3     nitroGLYcerin (NITROSTAT) 0.4 MG sublingual tablet Place 0.4 mg under the tongue       Omega-3 Fatty Acids (FISH OIL PO) Take 1 capsule by mouth       omeprazole (PRILOSEC) 20 MG CR capsule        predniSONE (DELTASONE) 5 MG tablet Take 3 tablets by mouth daily. 90 tablet 3     SIMVASTATIN PO        tacrolimus (PROTOPIC) 0.1 % ointment Apply topically 2 times daily 60 g 0     triamcinolone (KENALOG) 0.1 % cream           Allergies:  No Known Allergies      Review of Systems:  Constitutional: No recent fevers, chills, night sweats.  Skin: As above in HPI. Otherwise no additional skin rashes or changes.    Physical exam:  Vitals: There were no vitals taken for this visit.  GEN: This is a well developed, well-nourished male in no acute distress, in a pleasant mood.    SKIN: Focused examination of the feet, abdomen, and arms was performed.  -Erythematous Plaque, of his right foot in there medial aspect with out scale and decreased tightness and erythematous plaque on left lateral foot without scale or tightness.  -Erythematous plaque present inferior to the umbilicus, no scale present  -No other lesions of concern on areas examined.     Impression/Plan:  1. Cutaneous sarcoidosis    The patient noted improvement of his skin since his last clinic visit while taking methotrexate 20 mg daily, minocycline 100 mg twice daily, prednisone 15 mg daily, and topical betamethasone ointment twice daily.  We will taper prednisone slowly over the course of the next 3 monthswhile continuing other medications as as mentioned.  Skin biopsy from 8/2018 showed no AFB or positive cultures making sarcoidosis as the etiology most likely    Steroid taper 1 mg qday decrease per week until off starting at 10 mg     Labs today: mild  hyperglycemia, mild anemia; acceptable for continued methotrexate use.    2. Dyspnea in the setting of sarcoidosis    The patient has noted a progressive Pasquale worsening dyspnea over the past 2 years since he is only able to ambulate 100-200 feet at a decent clamp before becoming dyspneic.  He received a chest x-ray in 6/2018 that did not show evidence for sarcoidosis of the lungs.  He had a TTE done within the [facility] system in 2017 without evidence of cardiac sarcoid  We will repeat TTE and do a CT chest to evaluate for dyspnea in the setting of cutaneous sarcoid to evaluate for visceral sarcoidosis  Update: TTE normal and CT showed evidence of calcified granulomatous disease.  Return to clinic in 3 months    Dr. Gaitan staffed the patient.    Burak Curry MD  Internal Medicine PGY-3    Staff Physician Comments:   I saw and evaluated the patient with the resident and I agree with the assessment and plan.  I was present for the examination.    Byron Gaitan MD  Dermatology Staff Physician  , Department of Dermatology

## 2018-10-09 NOTE — NURSING NOTE
Dermatology Rooming Note    Benji Fields's goals for this visit include:   Chief Complaint   Patient presents with     Derm Problem     Benji is here for a follow up regarding his CA. He states that it has improved since his last visit.      Elisabeth Richey LPN

## 2018-10-09 NOTE — MR AVS SNAPSHOT
After Visit Summary   10/9/2018    Benji Fields    MRN: [id number]           Patient Information     Date Of Birth          1950        Visit Information        Provider Department      10/9/2018 7:40 AM Byron Gaitan MD [facility] Dermatology        Today's Diagnoses     Methotrexate, long term, current use    -  1    Cutaneous sarcoidosis        Shortness of breath           Follow-ups after your visit        Your next 10 appointments already scheduled     Oct 09, 2018  8:45 AM CDT   LAB with  LAB   [facility])    [address] [place] [id number] [phone]           Please do not eat 10-12 hours before your appointment if you are coming in fasting for labs on lipids, cholesterol, or glucose (sugar). This does not apply to pregnant women. Water, hot tea and black coffee (with nothing added) are okay. Do not drink other fluids, diet soda or chew gum.            Jan 08, 2019  7:40 AM CST   (Arrive by 7:25 AM)   Return Visit with Byron Gaitan MD   [facility] Dermatology ([facility])    [address] [place] [id number] [phone]              Future tests that were ordered for you today     Open Future Orders        Priority Expected Expires Ordered    Echocardiogram Complete Routine  10/9/2019 10/9/2018    CT Chest w/o Contrast Routine  10/9/2019 10/9/2018    Comprehensive metabolic panel Routine  10/9/2019 10/9/2018    CBC with platelets differential Routine  10/9/2019 10/9/2018            Who to contact     Please call your clinic at [phone] to:    Ask questions about your health    Make or cancel appointments    Discuss your medicines    Learn about your test results    Speak to your doctor            Additional Information About Your Visit        MyChart Information     [employer] gives you secure access to your electronic health record. If you see a primary  care provider, you can also send messages to your care team and make appointments. If you have questions, please call your primary care clinic.  If you do not have a primary care provider, please call [phone] and they will assist you.      [employer] is an electronic gateway that provides easy, online access to your medical records. With [employer], you can request a clinic appointment, read your test results, renew a prescription or communicate with your care team.     To access your existing account, please contact your [facility] or call [phone] for assistance.        Care EveryWhere ID     This is your Care EveryWhere ID. This could be used by other organizations to access your [place] medical records  [id number]         Blood Pressure from Last 3 Encounters:   10/06/15 (!) 145/107    Weight from Last 3 Encounters:   10/06/15 102.1 kg (225 lb)                 Today's Medication Changes          These changes are accurate as of 10/9/18  8:20 AM.  If you have any questions, ask your nurse or doctor.               These medicines have changed or have updated prescriptions.        Dose/Directions    * predniSONE 5 MG tablet   Commonly known as:  DELTASONE   This may have changed:  Another medication with the same name was added. Make sure you understand how and when to take each.   Used for:  Cutaneous sarcoidosis   Changed by:  Byron Gaitan MD        Take 3 tablets by mouth daily.   Quantity:  90 tablet   Refills:  3       * predniSONE 1 MG tablet   Commonly known as:  DELTASONE   This may have changed:  You were already taking a medication with the same name, and this prescription was added. Make sure you understand how and when to take each.   Used for:  Cutaneous sarcoidosis   Changed by:  Byron Gaitan MD        Take 10 mg daily for 1 week, 9 mg daily for 1 week, 8 mg daily for 1 week, continue taper 1 mg decrease per week until off   Quantity:  150 tablet    Refills:  0       * predniSONE 5 MG tablet   Commonly known as:  DELTASONE   This may have changed:  You were already taking a medication with the same name, and this prescription was added. Make sure you understand how and when to take each.   Used for:  Cutaneous sarcoidosis   Changed by:  Byron Gaitan MD        Steroid taper, 10 mg daily for 1 week, 9 mg daily for 1 week, continue 1 mg decrease per week until off   Quantity:  50 tablet   Refills:  3       * Notice:  This list has 3 medication(s) that are the same as other medications prescribed for you. Read the directions carefully, and ask your doctor or other care provider to review them with you.         Where to get your medicines      These medications were sent to [facility] [address] [place] [address] [address]  [address] [place] [address] [address] [place] [id number]    Hours:  TRANSPLANT PHONE NUMBER [phone] Phone:  [phone]     predniSONE 1 MG tablet    predniSONE 5 MG tablet                Primary Care Provider Office Phone # Fax #    Sammy Lobo [phone]       [facility] [address]  [facility] [address]        Equal Access to Services     AGAPITO AVINA : Hadii carly dietrich Sosofai, waaxda luqadaha, qaybta kaalmada rito, blade anderson. So [facility] [phone].    ATENCIÓN: Si habla español, tiene a casey disposición servicios gratuitos de asistencia lingüística. [facility] [phone].    We comply with applicable federal civil rights laws and [place] laws. We do not discriminate on the basis of race, color, national origin, age, disability, sex, sexual orientation, or gender identity.            Thank you!     Thank you for choosing [facility] DERMATOLOG[facility]  for your care. Our goal is always to provide you with excellent care. Hearing back from our patients is one way we can continue to improve our services. Please take a few minutes to complete the  written survey that you may receive in the mail after your visit with us. Thank you!             Your Updated Medication List - Protect others around you: Learn how to safely use, store and throw away your medicines at www.disposemymeds.org.          This list is accurate as of 10/9/18  8:20 AM.  Always use your most recent med list.                   Brand Name Dispense Instructions for use Diagnosis    betamethasone dipropionate 0.05 % ointment    DIPROSONE    50 g    Apply topically 2 times daily    Cutaneous sarcoidosis       Calcium Carb-Cholecalciferol 600-800 MG-UNIT Tabs      Take 1 tablet orally once daily.        clopidogrel 75 MG tablet    PLAVIX     Take 75 mg by mouth        FISH OIL PO      Take 1 capsule by mouth        folic acid 1 MG tablet    FOLVITE          glucosamine-chondroitin 500-400 MG Caps per capsule      Take 2 capsules by mouth        lisinopril 10 MG tablet    PRINIVIL/ZESTRIL          methotrexate sodium 2.5 MG Tabs     32 tablet    Take 8 tablets (20 mg) by mouth once a week    Cutaneous sarcoidosis       metoprolol tartrate 25 MG tablet    LOPRESSOR          minocycline 50 MG capsule    MINOCIN/DYNACIN    60 capsule    Take 2 capsules (100 mg) by mouth 2 times daily    Cutaneous sarcoidosis       nitroGLYcerin 0.4 MG sublingual tablet    NITROSTAT     Place 0.4 mg under the tongue        omeprazole 20 MG CR capsule    priLOSEC          * predniSONE 5 MG tablet    DELTASONE    90 tablet    Take 3 tablets by mouth daily.    Cutaneous sarcoidosis       * predniSONE 1 MG tablet    DELTASONE    150 tablet    Take 10 mg daily for 1 week, 9 mg daily for 1 week, 8 mg daily for 1 week, continue taper 1 mg decrease per week until off    Cutaneous sarcoidosis       * predniSONE 5 MG tablet    DELTASONE    50 tablet    Steroid taper, 10 mg daily for 1 week, 9 mg daily for 1 week, continue 1 mg decrease per week until off    Cutaneous sarcoidosis       SIMVASTATIN PO           tacrolimus 0.1 %  ointment    PROTOPIC    60 g    Apply topically 2 times daily    Cutaneous sarcoidosis       triamcinolone 0.1 % cream    KENALOG          * Notice:  This list has 3 medication(s) that are the same as other medications prescribed for you. Read the directions carefully, and ask your doctor or other care provider to review them with you.

## 2018-10-09 NOTE — NURSING NOTE
Drug Administration Record    Drug Name: triamcinolone acetonide(kenalog)  Dose: 1mL of triamcinolone 10mg/mL, 10mg dose  Route administered: ID  NDC #: Kenalog-10 (9878-6100-42)  Amount of waste(mL):4  Reason for waste: Single use vial    LOT #: [id number]  SITE: Skin  : [employer]  EXPIRATION DATE: 04/2020

## 2018-10-09 NOTE — LETTER
10/9/2018     RE: Benji Fields  [address] [facility] [address]     Dear Colleague,    Thank you for referring your patient, Benji Fields, to the [facility]ERMAT[facility] at [facility]. Please see a copy of my visit note below.    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 He states that it has improved since his last visit.        History of Present Illness:  Mr. Benji Fields is a 68 year old male who presents as a follow-up for cutaneous sarcoidosis. The patient was last seen 8/21/18.  The patient has noted improvement of his skin in his bilateral feet.  He noted that he uses the clobetasol on these areas about once per day or once every other day.  He denies discomfort at these areas.  He noted soreness of his second distal MTP with driving.  He further noted worsening dyspnea over the past 2 years such that at present he is only able to ambulate 100-200 feet at a decent pace before becoming dyspneic.  He denies chest pain, but is noted significant back pain.    Past Medical History:   There is no problem list on file for this patient.    Past Medical History:   Diagnosis Date     High cholesterol      Hypertension      Past Surgical History:   Procedure Laterality Date     HERNIA REPAIR         Medications:  Current Outpatient Prescriptions   Medication Sig Dispense Refill     betamethasone dipropionate (DIPROSONE) 0.05 % ointment Apply topically 2 times daily 50 g 3     Calcium Carb-Cholecalciferol 600-800 MG-UNIT TABS Take 1 tablet orally once daily.        clopidogrel (PLAVIX) 75 MG tablet Take 75 mg by mouth       folic acid (FOLVITE) 1 MG tablet        glucosamine-chondroitin 500-400 MG CAPS per capsule Take 2 capsules by mouth       lisinopril (PRINIVIL/ZESTRIL) 10 MG tablet        methotrexate sodium 2.5 MG TABS Take 8 tablets (20 mg) by mouth once a week 32 tablet 2     metoprolol tartrate (LOPRESSOR) 25 MG tablet        minocycline (MINOCIN/DYNACIN) 50 MG capsule Take 2 capsules (100 mg) by mouth 2 times daily 60 capsule 3     nitroGLYcerin (NITROSTAT) 0.4 MG sublingual tablet Place 0.4 mg under the tongue       Omega-3 Fatty Acids (FISH OIL PO) Take 1 capsule by mouth       omeprazole (PRILOSEC) 20 MG CR capsule        predniSONE (DELTASONE) 5 MG tablet Take 3 tablets by mouth daily. 90 tablet 3     SIMVASTATIN PO        tacrolimus (PROTOPIC) 0.1 % ointment Apply topically 2 times daily 60 g 0     triamcinolone (KENALOG) 0.1 % cream           Allergies:  No Known Allergies      Review of Systems:  Constitutional: No recent fevers, chills, night sweats.  Skin: As above in HPI. Otherwise no additional skin rashes or changes.    Physical exam:  Vitals: There were no vitals taken for this visit.  GEN: This is a well developed, well-nourished male in no acute distress, in a pleasant mood.    SKIN: Focused examination of the feet, abdomen, and arms was performed.  -Erythematous Plaque, of his right foot in there medial aspect with out scale and decreased tightness and erythematous plaque on left lateral foot without scale or tightness.  -Erythematous plaque present inferior to the umbilicus, no scale present  -No other lesions of concern on areas examined.     Impression/Plan:  1. Cutaneous sarcoidosis    The patient noted improvement of his skin since his last clinic visit while taking methotrexate 20 mg daily, minocycline 100 mg twice daily, prednisone 15 mg daily, and topical betamethasone ointment twice daily.  We will taper prednisone slowly over the course of  the next 3 monthswhile continuing other medications as as mentioned.  Skin biopsy from 8/2018 showed no AFB or positive cultures making sarcoidosis as the etiology most likely    Steroid taper 1 mg qday decrease per week until off starting at 10 mg     Labs today: mild hyperglycemia, mild anemia; acceptable for continued methotrexate use.    2. Dyspnea in the setting of sarcoidosis    The patient has noted a progressive Pasquale worsening dyspnea over the past 2 years since he is only able to ambulate 100-200 feet at a decent clamp before becoming dyspneic.  He received a chest x-ray in 6/2018 that did not show evidence for sarcoidosis of the lungs.  He had a TTE done within the [facility] system in 2017 without evidence of cardiac sarcoid  We will repeat TTE and do a CT chest to evaluate for dyspnea in the setting of cutaneous sarcoid to evaluate for visceral sarcoidosis  Update: TTE normal and CT showed evidence of calcified granulomatous disease.  Return to clinic in 3 months    Dr. Gaitan staffed the patient.    Burak Curry MD  Internal Medicine PGY-3    Staff Physician Comments:   I saw and evaluated the patient with the resident and I agree with the assessment and plan.  I was present for the examination.    Byron Gaitan MD  Dermatology Staff Physician  , Department of Dermatology

## 2018-10-13 LAB
MYCOBACTERIUM SPEC CULT: NORMAL
MYCOBACTERIUM SPEC CULT: NORMAL
SPECIMEN SOURCE: NORMAL

## 2018-10-18 ENCOUNTER — TELEPHONE (OUTPATIENT)
Dept: DERMATOLOGY | Facility: CLINIC | Age: 68
End: 2018-10-18

## 2018-10-18 NOTE — TELEPHONE ENCOUNTER
[facility] Call Center    Phone Message    May a detailed message be left on voicemail: yes    Reason for Call: Other: Pt of Dr. Gaitan, Pt's Insurance is changing at the beginning of 2019 and [facility] will be out of Network. Insurance will only pay partial once the new year begins. Pt has been added to wait list but wants to take any additional steps possible to increase chances in getting a Appt. in 2018.  Please call Pt back if  there becomes any Availability in 2018.      Action Taken: Message routed to:  [facility] DERM;

## 2018-10-31 DIAGNOSIS — D86.3 CUTANEOUS SARCOIDOSIS (H): ICD-10-CM

## 2018-11-01 RX ORDER — MINOCYCLINE HYDROCHLORIDE 50 MG/1
CAPSULE ORAL
Qty: 180 CAPSULE | Refills: 0 | OUTPATIENT
Start: 2018-11-01

## 2018-11-02 DIAGNOSIS — D86.3 CUTANEOUS SARCOIDOSIS (H): ICD-10-CM

## 2018-11-05 RX ORDER — MINOCYCLINE HYDROCHLORIDE 50 MG/1
100 CAPSULE ORAL 2 TIMES DAILY
Qty: 120 CAPSULE | Refills: 1 | Status: SHIPPED | OUTPATIENT
Start: 2018-11-05 | End: 2018-12-18

## 2018-11-05 NOTE — TELEPHONE ENCOUNTER
Medication requested:  minocycline (MINOCIN/DYNACIN) 50 MG   Last refilled: 8/21/18  Qty: 60  :3  MED RECOM. :  minocycline 100 mg twice daily    Last seen: 10/9/18  RTC: 3 MOS   Next appt:  12/18/18   Routing refill request to provider for review/approval because:  Drug not on the refill protocol    #60 vs  # 120 ?

## 2018-11-05 NOTE — TELEPHONE ENCOUNTER
[facility] Call Center    Phone Message    May a detailed message be left on voicemail: yes    Reason for Call: Other: Pt is calling to see how he can get a prescription refill, he said that his pharmacy calls and his doctor denies it, pt needs to know what he needs to do, please call     Action Taken: Message routed to:  [facility] Dermatology Clinic

## 2018-11-05 NOTE — TELEPHONE ENCOUNTER
Refill is appropriate.     Last seen on 10/9/18:  1. Cutaneous sarcoidosis    The patient noted improvement of his skin since his last clinic visit while taking methotrexate 20 mg daily, minocycline 100 mg twice daily, prednisone 15 mg daily, and topical betamethasone ointment twice daily.  We will taper prednisone slowly over the course of the next 3 monthswhile continuing other medications as as mentioned.  Skin biopsy from 8/2018 showed no AFB or positive cultures making sarcoidosis as the etiology most likely    Steroid taper 1 mg qday decrease per week until off starting at 10 mg     Labs today: mild hyperglycemia, mild anemia; acceptable for continued methotrexate use.

## 2018-12-03 DIAGNOSIS — D86.3 CUTANEOUS SARCOIDOSIS (H): ICD-10-CM

## 2018-12-03 RX ORDER — METHOTREXATE 2.5 MG/1
20 TABLET ORAL WEEKLY
Qty: 24 TABLET | Refills: 0 | Status: SHIPPED | OUTPATIENT
Start: 2018-12-03 | End: 2018-12-18

## 2018-12-03 NOTE — TELEPHONE ENCOUNTER
Received refill request for methotrexate as the resident on call. Reviewed patient's chart and attached communication. Patient last seen 10/9/18 for suspected cutaneous sarcoidosis. RTC 12/18/18. After reviewing the medication list and assessment and plan from last visit, the refill request was accepted. Patient is completely out of medication, will provide enough until follow up appointment with Dr. Gaitan.      Routed as FY.    Jolly Grajeda MD  PGY-2 Medicine-Dermatology  Pager [phone]

## 2018-12-03 NOTE — TELEPHONE ENCOUNTER
[facility] Call Center    Phone Message    May a detailed message be left on voicemail: yes    Reason for Call: Medication Question or concern regarding medication   Prescription Clarification  Name of Medication: methotrexate sodium 2.5 MG TABS  Prescribing Provider: Dr. Gaitan   Pharmacy: [facility] [address] ph: [phone]   What on the order needs clarification? Pt is completely out of the medication. 2-3 Pills. Pt is requesting enough to last until his appt w/ Dr. Gaitan on 12/18/18. Pt stated he has no refills remaining.          Action Taken: Message routed to:  [facility] Derm

## 2018-12-03 NOTE — TELEPHONE ENCOUNTER
methotrexate sodium 2.5 MG TABS      Last Written Prescription Date:  8-21-18  Last Fill Quantity: 32,   # refills: 2  Last Office Visit: 10-9-18  Future Office visit:  12-18-18    CBC RESULTS:   Recent Labs   Lab Test  10/09/18   0851   WBC  5.7   RBC  4.36*   HGB  13.1*   HCT  40.7   MCV  93   MCH  30.0   MCHC  32.2   RDW  14.6   PLT  207       Creatinine   Date Value Ref Range Status   10/09/2018 1.04 0.66 - 1.25 mg/dL Final   ]    Liver Function Studies -   Recent Labs   Lab Test  10/09/18   0851   PROTTOTAL  7.0   ALBUMIN  3.5   BILITOTAL  0.6   ALKPHOS  55   AST  25   ALT  35       Routing refill request to provider for review/approval because:  Not on protocol.      Kathleen M Doege RN

## 2018-12-07 ENCOUNTER — PATIENT OUTREACH (OUTPATIENT)
Dept: CARE COORDINATION | Facility: CLINIC | Age: 68
End: 2018-12-07

## 2018-12-18 ENCOUNTER — OFFICE VISIT (OUTPATIENT)
Dept: DERMATOLOGY | Facility: CLINIC | Age: 68
End: 2018-12-18
Payer: COMMERCIAL

## 2018-12-18 DIAGNOSIS — Z51.81 MEDICATION MONITORING ENCOUNTER: ICD-10-CM

## 2018-12-18 DIAGNOSIS — D86.3 CUTANEOUS SARCOIDOSIS (H): ICD-10-CM

## 2018-12-18 DIAGNOSIS — Z51.81 MEDICATION MONITORING ENCOUNTER: Primary | ICD-10-CM

## 2018-12-18 DIAGNOSIS — R06.00 DYSPNEA, UNSPECIFIED TYPE: ICD-10-CM

## 2018-12-18 PROBLEM — D86.9 SARCOIDOSIS: Status: ACTIVE | Noted: 2018-12-18

## 2018-12-18 LAB
ALBUMIN SERPL-MCNC: 3.6 G/DL (ref 3.4–5)
ALP SERPL-CCNC: 124 U/L (ref 40–150)
ALT SERPL W P-5'-P-CCNC: 23 U/L (ref 0–70)
ANION GAP SERPL CALCULATED.3IONS-SCNC: 8 MMOL/L (ref 3–14)
AST SERPL W P-5'-P-CCNC: 14 U/L (ref 0–45)
BASOPHILS # BLD AUTO: 0 10E9/L (ref 0–0.2)
BASOPHILS NFR BLD AUTO: 0.4 %
BILIRUB SERPL-MCNC: 0.5 MG/DL (ref 0.2–1.3)
BUN SERPL-MCNC: 29 MG/DL (ref 7–30)
CALCIUM SERPL-MCNC: 8.5 MG/DL (ref 8.5–10.1)
CHLORIDE SERPL-SCNC: 102 MMOL/L (ref 94–109)
CO2 SERPL-SCNC: 25 MMOL/L (ref 20–32)
CREAT SERPL-MCNC: 0.91 MG/DL (ref 0.66–1.25)
DIFFERENTIAL METHOD BLD: ABNORMAL
EOSINOPHIL # BLD AUTO: 0.2 10E9/L (ref 0–0.7)
EOSINOPHIL NFR BLD AUTO: 3 %
ERYTHROCYTE [DISTWIDTH] IN BLOOD BY AUTOMATED COUNT: 15.2 % (ref 10–15)
GFR SERPL CREATININE-BSD FRML MDRD: 83 ML/MIN/1.7M2
GLUCOSE SERPL-MCNC: 106 MG/DL (ref 70–99)
HCT VFR BLD AUTO: 39.8 % (ref 40–53)
HGB BLD-MCNC: 12.5 G/DL (ref 13.3–17.7)
IMM GRANULOCYTES # BLD: 0.2 10E9/L (ref 0–0.4)
IMM GRANULOCYTES NFR BLD: 2.2 %
LYMPHOCYTES # BLD AUTO: 1.1 10E9/L (ref 0.8–5.3)
LYMPHOCYTES NFR BLD AUTO: 17.1 %
MCH RBC QN AUTO: 27.7 PG (ref 26.5–33)
MCHC RBC AUTO-ENTMCNC: 31.4 G/DL (ref 31.5–36.5)
MCV RBC AUTO: 88 FL (ref 78–100)
MONOCYTES # BLD AUTO: 1 10E9/L (ref 0–1.3)
MONOCYTES NFR BLD AUTO: 14.2 %
NEUTROPHILS # BLD AUTO: 4.2 10E9/L (ref 1.6–8.3)
NEUTROPHILS NFR BLD AUTO: 63.1 %
NRBC # BLD AUTO: 0 10*3/UL
NRBC BLD AUTO-RTO: 0 /100
PLATELET # BLD AUTO: 284 10E9/L (ref 150–450)
POTASSIUM SERPL-SCNC: 3.8 MMOL/L (ref 3.4–5.3)
PROT SERPL-MCNC: 7.4 G/DL (ref 6.8–8.8)
RBC # BLD AUTO: 4.52 10E12/L (ref 4.4–5.9)
SODIUM SERPL-SCNC: 136 MMOL/L (ref 133–144)
WBC # BLD AUTO: 6.7 10E9/L (ref 4–11)

## 2018-12-18 RX ORDER — MINOCYCLINE HYDROCHLORIDE 50 MG/1
100 CAPSULE ORAL 2 TIMES DAILY
Qty: 120 CAPSULE | Refills: 3 | Status: SHIPPED | OUTPATIENT
Start: 2018-12-18 | End: 2019-07-30

## 2018-12-18 RX ORDER — METHOTREXATE 2.5 MG/1
20 TABLET ORAL WEEKLY
Qty: 32 TABLET | Refills: 3 | Status: SHIPPED | OUTPATIENT
Start: 2018-12-18 | End: 2019-03-21

## 2018-12-18 ASSESSMENT — PAIN SCALES - GENERAL: PAINLEVEL: NO PAIN (0)

## 2018-12-18 NOTE — PROGRESS NOTES
[facility] Dermatology Note    Dermatology Problem List:  1. 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  - Current rx: MTX 20mg po weekly, folic acid 1mg daily, minocycline 100 mg BID, betamethasone 0.05% ointment BID   - Previous tx: Prednisone (tapered off, last dose ~12/19/18), ILK, TMC 0.1% cream  - Bx 6/15/18 with granulomatous dermatitis, cx negative 7/2018    Encounter Date: Dec 18, 2018    CC:   Chief Complaint   Patient presents with     Derm Problem     Cutaneous sarcoidosis - Benji notes that his skin is doing well. He is almost done with the prednisone taper.        History of Present Illness:  Mr. Benji Fields is a 68 year old male who presents as a follow-up for cutaneous sarcoidosis. The patient was last seen 10/9/18 at which time a taper of his prednisone was initiated. He was continued on methotrexate 20 mg weekly, folic acid 1 mg daily, and minocycline 100 mg BID. A chest CT and TTE were also obtained due to dyspnea on exertion.    Since his last visit, he has overall been doing well. Has only one day of his prednisone taper left and he has not noted any changes in either his skin or his breathing while tapering off the prednisone. He continues his regimen as above. Takes methotrexate on Mondays. Not using betamethasone too much as it is difficult to apply given his back issues. Recently had back surgery. Notes ongoing dyspnea on exertion which is stable. He associates this with his back pain, no chest pain. Also notes pain in his right foot/2nd toe. Otherwise in usual state of health, no additional skin concerns.    Past Medical History:   Patient Active Problem List   Diagnosis     Cutaneous sarcoidosis     Past Medical History:   Diagnosis Date     High cholesterol      Hypertension      Past Surgical History:   Procedure Laterality Date     HERNIA REPAIR         Medications:  Current Outpatient Medications   Medication Sig Dispense Refill     betamethasone dipropionate  (DIPROSONE) 0.05 % ointment Apply topically 2 times daily 50 g 3     Calcium Carb-Cholecalciferol 600-800 MG-UNIT TABS Take 1 tablet orally once daily.       clopidogrel (PLAVIX) 75 MG tablet Take 75 mg by mouth       folic acid (FOLVITE) 1 MG tablet        glucosamine-chondroitin 500-400 MG CAPS per capsule Take 2 capsules by mouth       lisinopril (PRINIVIL/ZESTRIL) 10 MG tablet        methotrexate sodium 2.5 MG TABS Take 8 tablets (20 mg) by mouth once a week 32 tablet 3     metoprolol tartrate (LOPRESSOR) 25 MG tablet        minocycline (MINOCIN/DYNACIN) 50 MG capsule Take 2 capsules (100 mg) by mouth 2 times daily 120 capsule 3     nitroGLYcerin (NITROSTAT) 0.4 MG sublingual tablet Place 0.4 mg under the tongue       Omega-3 Fatty Acids (FISH OIL PO) Take 1 capsule by mouth       omeprazole (PRILOSEC) 20 MG CR capsule        predniSONE (DELTASONE) 1 MG tablet Take 10 mg daily for 1 week, 9 mg daily for 1 week, 8 mg daily for 1 week, continue taper 1 mg decrease per week until off 150 tablet 0     predniSONE (DELTASONE) 5 MG tablet Steroid taper, 10 mg daily for 1 week, 9 mg daily for 1 week, continue 1 mg decrease per week until off 50 tablet 3     SIMVASTATIN PO        tacrolimus (PROTOPIC) 0.1 % ointment Apply topically 2 times daily 60 g 0     triamcinolone (KENALOG) 0.1 % cream           Allergies:  No Known Allergies      Review of Systems:  - Constitutional: Otherwise in baseline state of health, generally feeling well.  - Skin: As above in HPI. Otherwise no additional skin rashes or changes.  - PULM: Chronic dyspnea, unchanged.    Physical exam:  GEN: This is a well developed, well-nourished male in no acute distress, in a pleasant mood.    SKIN: Focused examination of the feet, abdomen, and arms was performed.  - Bilateral plantar feet and left posterior heel with purplish patches and minimal scaling. Compared to prior photos, there has been substantial improvement in erythema and scaling.  - Right pink  patch present inferior to the umbilicus, no scale present.  - No other lesions of concern on areas examined.     Impression/Plan:  1. Cutaneous sarcoidosis (bx 6/2018 with granulomatous dermatitis; cultures including AFB negative), significantly improved on current regimen and now nearly off prednisone (has one day left). At this time, will continue plan as below. Will also ask for formal pulm evaluation given ongoing dyspnea - note TTE 10/9/18 no abnormality and CT chest 10/9/18 with calcified subcarinal and bilateral hilar LNs as well as CAD (patient had MI in 2016). No prior PFTs that I can review. Further disussed that arthralgias/arthritis can be seen in sarcoidosis but would recommend discussing right foot pain with PCP for consideration of other etiologies such as osteoarthritis.    Continue methotrexate 20 mg weekly.    Lab monitoring obtained 12/18/2018 and notable for stable mild normocytic anemia.     Complete prednisone taper (last dose 12/19/18).    Continue minocyline 100 mg BID.    Continue betamethasone ointment BID prn (not using too much currently due to back issues).    Pulm referral for chronic dyspnea; consideration for sarcoid involvement although of note, his pulmonary symptoms have not improved despite responding to treatment for cutaneous sarcoidosis. CT and TTE findings as above. No prior PFTs I can review.    Patient will call if flares off prednisone.    Advised to discuss foot pain with PCP.    Return to clinic in 3 months, sooner if concerns.    Dr. Gaitan staffed the patient.    Staff Involved:  Resident (Tea Lovell)/Staff    Staff Physician Comments:   I saw and evaluated the patient with the resident and I agree with the assessment and plan.  I was present for the examination. I have made edits if needed.    Byron Gaitan MD  Staff Dermatologist and Dermatopathologist  , Department of Dermatology

## 2018-12-18 NOTE — NURSING NOTE
Dermatology Rooming Note    Benji Fields's goals for this visit include:   Chief Complaint   Patient presents with     Derm Problem     Cutaneous sarcoidosis - Benji notes that his skin is doing well. He is almost done with the prednisone taper.      Jodie Baez, CMA

## 2018-12-18 NOTE — PATIENT INSTRUCTIONS
Things look great today.  Continue the methotrexate and minocycline as you are.  If you have any problems coming off the prednisone, please let us know.    We will ask the lung doctors to see you to make sure the lungs are doing okay.    I would talk to your primary care doctor about the pain in your right foot.    Return in 3 months, sooner if concerns.

## 2018-12-18 NOTE — LETTER
12/18/2018       RE: Benji Fields  [address] [address]     Dear Colleague,    Thank you for referring your patient, Benji Fields, to the [facility] DERMATOLOGY at [facility]. Please see a copy of my visit note below.    [facility] Dermatology Note    Dermatology Problem List:  1. 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  - Current rx: MTX 20mg po weekly, folic acid 1mg daily, minocycline 100 mg BID, betamethasone 0.05% ointment BID   - Previous tx: Prednisone (tapered off, last dose ~12/19/18), ILK, TMC 0.1% cream  - Bx 6/15/18 with granulomatous dermatitis, cx negative 7/2018    Encounter Date: Dec 18, 2018    CC:   Chief Complaint   Patient presents with     Derm Problem     Cutaneous sarcoidosis - Benji notes that his skin is doing well. He is almost done with the prednisone taper.        History of Present Illness:  Mr. Benji Fields is a 68 year old male who presents as a follow-up for cutaneous sarcoidosis. The patient was last seen 10/9/18 at which time a taper of his prednisone was initiated. He was continued on methotrexate 20 mg weekly, folic acid 1 mg daily, and minocycline 100 mg BID. A chest CT and TTE were also obtained due to dyspnea on exertion.    Since his last visit, he has overall been doing well. Has only one day of his prednisone taper left and he has not noted any changes in either his skin or his breathing while tapering off the prednisone. He continues his regimen as above. Takes methotrexate on Mondays. Not using betamethasone too much as it is difficult to apply given his back issues. Recently had back surgery. Notes ongoing dyspnea on exertion which is stable. He associates this with his back pain, no chest pain. Also notes pain in his right foot/2nd toe. Otherwise in usual state of health, no additional skin concerns.    Past Medical History:   Patient Active Problem List   Diagnosis     Cutaneous sarcoidosis     Past  Medical History:   Diagnosis Date     High cholesterol      Hypertension      Past Surgical History:   Procedure Laterality Date     HERNIA REPAIR         Medications:  Current Outpatient Medications   Medication Sig Dispense Refill     betamethasone dipropionate (DIPROSONE) 0.05 % ointment Apply topically 2 times daily 50 g 3     Calcium Carb-Cholecalciferol 600-800 MG-UNIT TABS Take 1 tablet orally once daily.       clopidogrel (PLAVIX) 75 MG tablet Take 75 mg by mouth       folic acid (FOLVITE) 1 MG tablet        glucosamine-chondroitin 500-400 MG CAPS per capsule Take 2 capsules by mouth       lisinopril (PRINIVIL/ZESTRIL) 10 MG tablet        methotrexate sodium 2.5 MG TABS Take 8 tablets (20 mg) by mouth once a week 32 tablet 3     metoprolol tartrate (LOPRESSOR) 25 MG tablet        minocycline (MINOCIN/DYNACIN) 50 MG capsule Take 2 capsules (100 mg) by mouth 2 times daily 120 capsule 3     nitroGLYcerin (NITROSTAT) 0.4 MG sublingual tablet Place 0.4 mg under the tongue       Omega-3 Fatty Acids (FISH OIL PO) Take 1 capsule by mouth       omeprazole (PRILOSEC) 20 MG CR capsule        predniSONE (DELTASONE) 1 MG tablet Take 10 mg daily for 1 week, 9 mg daily for 1 week, 8 mg daily for 1 week, continue taper 1 mg decrease per week until off 150 tablet 0     predniSONE (DELTASONE) 5 MG tablet Steroid taper, 10 mg daily for 1 week, 9 mg daily for 1 week, continue 1 mg decrease per week until off 50 tablet 3     SIMVASTATIN PO        tacrolimus (PROTOPIC) 0.1 % ointment Apply topically 2 times daily 60 g 0     triamcinolone (KENALOG) 0.1 % cream           Allergies:  No Known Allergies      Review of Systems:  - Constitutional: Otherwise in baseline state of health, generally feeling well.  - Skin: As above in HPI. Otherwise no additional skin rashes or changes.  - PULM: Chronic dyspnea, unchanged.    Physical exam:  GEN: This is a well developed, well-nourished male in no acute distress, in a pleasant mood.     SKIN: Focused examination of the feet, abdomen, and arms was performed.  - Bilateral plantar feet and left posterior heel with purplish patches and minimal scaling. Compared to prior photos, there has been substantial improvement in erythema and scaling.  - Right pink patch present inferior to the umbilicus, no scale present.  - No other lesions of concern on areas examined.     Impression/Plan:  1. Cutaneous sarcoidosis (bx 6/2018 with granulomatous dermatitis; cultures including AFB negative), significantly improved on current regimen and now nearly off prednisone (has one day left). At this time, will continue plan as below. Will also ask for formal pulm evaluation given ongoing dyspnea - note TTE 10/9/18 no abnormality and CT chest 10/9/18 with calcified subcarinal and bilateral hilar LNs as well as CAD (patient had MI in 2016). No prior PFTs that I can review. Further disussed that arthralgias/arthritis can be seen in sarcoidosis but would recommend discussing right foot pain with PCP for consideration of other etiologies such as osteoarthritis.    Continue methotrexate 20 mg weekly.    Lab monitoring obtained 12/18/2018 and notable for stable mild normocytic anemia.     Complete prednisone taper (last dose 12/19/18).    Continue minocyline 100 mg BID.    Continue betamethasone ointment BID prn (not using too much currently due to back issues).    Pulm referral for chronic dyspnea; consideration for sarcoid involvement although of note, his pulmonary symptoms have not improved despite responding to treatment for cutaneous sarcoidosis. CT and TTE findings as above. No prior PFTs I can review.    Patient will call if flares off prednisone.    Advised to discuss foot pain with PCP.    Return to clinic in 3 months, sooner if concerns.    Dr. Gaitan staffed the patient.    Staff Involved:  Resident (Tea Lovell)/Staff    Staff Physician Comments:   I saw and evaluated the patient with the resident and I agree  with the assessment and plan.  I was present for the examination. I have made edits if needed.      Byron Gaitan MD

## 2019-02-05 NOTE — TELEPHONE ENCOUNTER
FUTURE VISIT INFORMATION      FUTURE VISIT INFORMATION:    Date: 2.15.19    Time: 2:20 PM    Location: [facility]m Clinic  REFERRAL INFORMATION:    Referring provider:  Dr. Gaitan    Referring providers clinic:  Derm    Reason for visit/diagnosis  Dyspnea    RECORDS REQUESTED FROM:       Clinic name Comments Records Status Imaging Status   Derm Referral placed 12.18.18 Epic

## 2019-02-14 NOTE — PROGRESS NOTES
[facility]    Name: Benji Fields MRN: [id number]     Age: 68 year old   YOB: 1950       Reason for Visit: New visit for dyspnea          HPI:   Benji Fields is a 68 year old male with history of cutaneous sarcoidosis, HTN, HL, and CAD who presents for evaluation of dyspnea.    Patient first presented to dermatology clinic at [facility] on 6/2018.  Prior to that he had been following with an outside primary care and dermatology for sores on his skin that began in 2016.  He had sores on his stomach, forearm, lower legs, and bottom of his feet.  At the time he had a reported biopsy consistent with cutaneous sarcoidosis, but was not available for review by the dermatology appointment.  He was on methotrexate 20 mg weekly and prednisone 50 mg daily at that point along with topical treatment. He underwent skin biopsy that showed granulomas both in sarcoid pattern and with focal necrotizing pattern, but AFB cultures from subsequent biopsies were negative. He underwent steroid taper starting in October 2018 (one mg per week), and his last dose of steroids was in the middle of December. Prior workup for systemic sarcoidosis include TTE and CT Chest done here in 10/2018 without features to suggest cardiac sarcoidosis or pulmonary involvement. Currently is on methotrexate 20mg weekly, minocycline 100mg BID, and prn betamethasone ointment.     Patient has difficulty discerning whether his inability to ambulate far distances is from his shortness of breath versus pain from his back and hips.  patient first felt short of breath in 2016, and underwent PCI in April 2016 with improvement of his symptoms.  Over the last several years has increasing back pain and hip pain which has limited his ability to exercise.  His significant other notes that he appears to be breathing harder when he exerts himself, especially when going up a flight of stairs.  He previously was able to walk 9 holes of golf,  but is unable to do so now, but again is unsure if that is due to the pain.  Denies any shortness of breath with laying flat or with talking.    A significant other also notes that patient has been having more difficulty sleeping.  Is only getting 4-5 hours of sleep at night, mostly due to discomfort at night.  He also snores and she has witnessed apneic episodes.  Naps often during the day.  No prior sleep study.    10 point ROS performed and negative except for as noted in HPI.         Past Medical History:     CAD  Cutaneous Sarcoidosis  HTN  HL         Past Surgical History:      Hernia repair  Lumbar fusion         Social History:     Retired .  Retired in .  Smoked for 4 years 1 pack/day, quit 50 years ago.  No pets in the home.  No mold or dust exposure.         Family History:     No family history of lung disease.         Allergies:   No Known Allergies          Medications:     Current Outpatient Medications on File Prior to Visit:  betamethasone dipropionate (DIPROSONE) 0.05 % ointment Apply topically 2 times daily   Calcium Carb-Cholecalciferol 600-800 MG-UNIT TABS Take 1 tablet orally once daily.   clopidogrel (PLAVIX) 75 MG tablet Take 75 mg by mouth   folic acid (FOLVITE) 1 MG tablet    glucosamine-chondroitin 500-400 MG CAPS per capsule Take 2 capsules by mouth   [] lidocaine 1% with EPINEPHrine 1:100,000 1 %-1:[id number] injection Inject 1 mL into the skin once for 1 dose   [] lidocaine 1% with EPINEPHrine 1:100,000 1 %-1:621661 injection Inject 3 mLs into the skin once for 1 dose   lisinopril (PRINIVIL/ZESTRIL) 10 MG tablet    methotrexate sodium 2.5 MG TABS Take 8 tablets (20 mg) by mouth once a week   metoprolol tartrate (LOPRESSOR) 25 MG tablet    minocycline (MINOCIN/DYNACIN) 50 MG capsule Take 2 capsules (100 mg) by mouth 2 times daily   nitroGLYcerin (NITROSTAT) 0.4 MG sublingual tablet Place 0.4 mg under the tongue   Omega-3 Fatty Acids (FISH OIL PO) Take 1  "capsule by mouth   omeprazole (PRILOSEC) 20 MG CR capsule    predniSONE (DELTASONE) 1 MG tablet Take 10 mg daily for 1 week, 9 mg daily for 1 week, 8 mg daily for 1 week, continue taper 1 mg decrease per week until off   predniSONE (DELTASONE) 5 MG tablet Steroid taper, 10 mg daily for 1 week, 9 mg daily for 1 week, continue 1 mg decrease per week until off   SIMVASTATIN PO    tacrolimus (PROTOPIC) 0.1 % ointment Apply topically 2 times daily   triamcinolone (KENALOG) 0.1 % cream      No current facility-administered medications on file prior to visit.            Exam:   /84   Pulse 62   Resp 17   Ht 1.727 m (5' 8\")   Wt 102.1 kg (225 lb)   SpO2 98%   BMI 34.21 kg/m   on RA  General: Well-appearing, no acute distress  HEENT: Oropharynx clear, no scleral icterus  Heart: Normal rate, regular rhythm  Lungs: Clear to auscultation bilaterally with good air movement  Skin: Area of discoloration on the right mid sole of his foot.  Extremities: No edema or clubbing  Psych: Normal affect  Neuro: Answer questions appropriately         Data:   TTE 10/2018  Global and regional left ventricular function is normal with an EF of 55-60%.  Global right ventricular function is normal.  No significant valvular abnormalities.  The inferior vena cava was normal in size with preserved respiratory  variability.  No pericardial effusion is present.  Previous study not available for comparison.    CT Chest 10/2018  1. Sequela of granulomatous disease  2. No focal consolidation, pleural effusion, or pneumothorax.  3. Incidental noted moderate to severe three-vessel coronary artery  Calcification    CXR 2/15/19  Personally reviewed. Lung fields clear.     PFT: 2/15/19    Personally interpreted: Moderate airflow obstruction with significant bronchodilator response.  Normal diffusion capacity.  Air trapping present.           Assessment and Plan:     Benji Fields is a 68 year old male with history of cutaneous sarcoidosis, HTN, HL, " "and CAD who presents for evaluation of dyspnea.    Dyspnea on Exertion  Airflow Obstruction with Reversibility  Hx of Cutaneous Sarcoidosis  It's unclear how much of patient's exertional capacity is limited by hip and back pain versus shortness of breath. Patient does not think his SOB is particularly problematic. He does have moderate obstruction on his PFTs today with reversibility. Differential includes asthma vs small airways disease secondary to sarcoidosis. It would be less common to have airflow obstruction as the only presentation of pulmonary sarcoidosis without other CT Chest findings such as LAD or infiltrates and disease course would follow along with the disease course of his skin. Treatment would also be the same as his current treatment for his cutaneous sarcoid.  --Will start treatment for asthma with Symbicort and reassess in 3 months.  --6MWT today  --Repeat PFTs and 6MWT    Routine Health Maintenance  --Flu shot and PCV13 today, PPSV23 in 2/2020    Possible Sleep Apnea  STOP-BANG of at least 5. Will discuss with patient at next visit regarding sleep medicine referral.    Patient staffed with Dr. Ward. Return to clinic in 3 months.    Loreto Graham  Pulmonary and Critical Care Fellow    Physician Attestation   I, Elliot Ward, saw this patient and agree with the findings and plan of care as documented in the note.      Items personally reviewed/procedural attestation: vitals, labs, imaging and agree with the interpretation documented in the note and spirometry report and agree with the interpretation documented in the note.  Benji Fields is a 68 year old male with history of cutaneous sarcoidosis, HTN, HL, and CAD who presents for evaluation of dyspnea.  /84   Pulse 62   Resp 17   Ht 1.727 m (5' 8\")   Wt 102.1 kg (225 lb)   SpO2 98%   BMI 34.21 kg/m   on RA  General: Well-appearing, no acute distress  HEENT: Oropharynx clear, no scleral icterus  Heart: Normal rate, regular " rhythm  Lungs: Clear to auscultation bilaterally with good air movement  Skin: Area of discoloration on the right mid sole of his foot.  Extremities: No edema or clubbing  Psych: Normal affect  Neuro: Answer questions appropriately  Dyspnea on Exertion  Airflow Obstruction with Reversibility  Hx of Cutaneous Sarcoidosis  It's unclear how much of patient's exertional capacity is limited by hip and back pain versus shortness of breath. Patient does not think his SOB is particularly problematic. He does have moderate obstruction on his PFTs today with reversibility. Differential includes asthma vs small airways disease secondary to sarcoidosis. It would be less common to have airflow obstruction as the only presentation of pulmonary sarcoidosis without other CT Chest findings such as LAD or infiltrates and disease course would follow along with the disease course of his skin. Treatment would also be the same as his current treatment for his cutaneous sarcoid.  --Will start treatment for asthma with Symbicort and reassess in 3 months.  --6MWT today  --Repeat PFTs and 6MWT     Routine Health Maintenance  --Flu shot and PCV13 today, PPSV23 in 2/2020     Possible Sleep Apnea  STOP      Elliot Ward MD

## 2019-02-15 ENCOUNTER — PRE VISIT (OUTPATIENT)
Dept: PULMONOLOGY | Facility: CLINIC | Age: 69
End: 2019-02-15

## 2019-02-15 ENCOUNTER — OFFICE VISIT (OUTPATIENT)
Dept: PULMONOLOGY | Facility: CLINIC | Age: 69
End: 2019-02-15
Attending: DERMATOLOGY
Payer: COMMERCIAL

## 2019-02-15 ENCOUNTER — ANCILLARY PROCEDURE (OUTPATIENT)
Dept: GENERAL RADIOLOGY | Facility: CLINIC | Age: 69
End: 2019-02-15
Attending: STUDENT IN AN ORGANIZED HEALTH CARE EDUCATION/TRAINING PROGRAM
Payer: COMMERCIAL

## 2019-02-15 VITALS
RESPIRATION RATE: 17 BRPM | DIASTOLIC BLOOD PRESSURE: 84 MMHG | SYSTOLIC BLOOD PRESSURE: 129 MMHG | OXYGEN SATURATION: 98 % | WEIGHT: 225 LBS | HEIGHT: 68 IN | BODY MASS INDEX: 34.1 KG/M2 | HEART RATE: 62 BPM

## 2019-02-15 DIAGNOSIS — R06.00 DYSPNEA, UNSPECIFIED TYPE: ICD-10-CM

## 2019-02-15 DIAGNOSIS — D86.3 CUTANEOUS SARCOIDOSIS (H): ICD-10-CM

## 2019-02-15 DIAGNOSIS — J45.909 MODERATE ASTHMA WITHOUT COMPLICATION, UNSPECIFIED WHETHER PERSISTENT: ICD-10-CM

## 2019-02-15 DIAGNOSIS — D86.3 CUTANEOUS SARCOIDOSIS (H): Primary | ICD-10-CM

## 2019-02-15 DIAGNOSIS — Z23 ENCOUNTER FOR IMMUNIZATION: Primary | ICD-10-CM

## 2019-02-15 LAB
6 MIN WALK (FT): 1120 FT
6 MIN WALK (M): 341 M

## 2019-02-15 PROCEDURE — G0008 ADMIN INFLUENZA VIRUS VAC: HCPCS

## 2019-02-15 PROCEDURE — G0008 ADMIN INFLUENZA VIRUS VAC: HCPCS | Mod: ZF

## 2019-02-15 PROCEDURE — G0009 ADMIN PNEUMOCOCCAL VACCINE: HCPCS

## 2019-02-15 PROCEDURE — 90670 PCV13 VACCINE IM: CPT | Mod: ZF | Performed by: STUDENT IN AN ORGANIZED HEALTH CARE EDUCATION/TRAINING PROGRAM

## 2019-02-15 PROCEDURE — 90662 IIV NO PRSV INCREASED AG IM: CPT | Mod: ZF | Performed by: STUDENT IN AN ORGANIZED HEALTH CARE EDUCATION/TRAINING PROGRAM

## 2019-02-15 PROCEDURE — 25000128 H RX IP 250 OP 636: Mod: ZF | Performed by: STUDENT IN AN ORGANIZED HEALTH CARE EDUCATION/TRAINING PROGRAM

## 2019-02-15 PROCEDURE — G0463 HOSPITAL OUTPT CLINIC VISIT: HCPCS | Mod: ZF

## 2019-02-15 PROCEDURE — G0009 ADMIN PNEUMOCOCCAL VACCINE: HCPCS | Mod: ZF

## 2019-02-15 RX ORDER — ROSUVASTATIN CALCIUM 40 MG/1
40 TABLET, COATED ORAL DAILY
COMMUNITY

## 2019-02-15 RX ORDER — ACETAMINOPHEN 500 MG
500-1000 TABLET ORAL EVERY 6 HOURS PRN
COMMUNITY

## 2019-02-15 RX ORDER — BUDESONIDE AND FORMOTEROL FUMARATE DIHYDRATE 80; 4.5 UG/1; UG/1
2 AEROSOL RESPIRATORY (INHALATION) 2 TIMES DAILY
Qty: 1 INHALER | Refills: 3 | Status: SHIPPED | OUTPATIENT
Start: 2019-02-15 | End: 2020-02-15

## 2019-02-15 RX ORDER — TRIAMTERENE/HYDROCHLOROTHIAZID 37.5-25 MG
1 TABLET ORAL DAILY
COMMUNITY

## 2019-02-15 RX ORDER — ASPIRIN 81 MG/1
81 TABLET ORAL DAILY
COMMUNITY

## 2019-02-15 RX ORDER — MELOXICAM 15 MG/1
15 TABLET ORAL DAILY
COMMUNITY

## 2019-02-15 RX ADMIN — INFLUENZA A VIRUS A/MICHIGAN/45/2015 X-275 (H1N1) ANTIGEN (FORMALDEHYDE INACTIVATED), INFLUENZA A VIRUS A/SINGAPORE/INFIMH-16-0019/2016 IVR-186 (H3N2) ANTIGEN (FORMALDEHYDE INACTIVATED), AND INFLUENZA B VIRUS B/MARYLAND/15/2016 BX-69A (A B/COLORADO/6/2017-LIKE VIRUS) ANTIGEN (FORMALDEHYDE INACTIVATED) 0.5 ML: 60; 60; 60 INJECTION, SUSPENSION INTRAMUSCULAR at 15:25

## 2019-02-15 RX ADMIN — PNEUMOCOCCAL 13-VALENT CONJUGATE VACCINE 0.5 ML: 2.2; 2.2; 2.2; 2.2; 2.2; 4.4; 2.2; 2.2; 2.2; 2.2; 2.2; 2.2; 2.2 INJECTION, SUSPENSION INTRAMUSCULAR at 15:25

## 2019-02-15 ASSESSMENT — PAIN SCALES - GENERAL: PAINLEVEL: MODERATE PAIN (5)

## 2019-02-15 ASSESSMENT — MIFFLIN-ST. JEOR: SCORE: 1775.09

## 2019-02-15 NOTE — LETTER
2/15/2019       RE: Benji Fields  [address] [facility] [address]     Dear Colleague,    Thank you for referring your patient, Benji Fields, to the [facility] at [facility]. Please see a copy of my visit note below.    [facility]    Name: Benji Fields MRN: [id number]     Age: 68 year old   YOB: 1950       Reason for Visit: New visit for dyspnea          HPI:   Benji Fields is a 68 year old male with history of cutaneous sarcoidosis, HTN, HL, and CAD who presents for evaluation of dyspnea.    Patient first presented to dermatology clinic at [facility] on 6/2018.  Prior to that he had been following with an outside primary care and dermatology for sores on his skin that began in 2016.  He had sores on his stomach, forearm, lower legs, and bottom of his feet.  At the time he had a reported biopsy consistent with cutaneous sarcoidosis, but was not available for review by the dermatology appointment.  He was on methotrexate 20 mg weekly and prednisone 50 mg daily at that point along with topical treatment. He underwent skin biopsy that showed granulomas both in sarcoid pattern and with focal necrotizing pattern, but AFB cultures from subsequent biopsies were negative. He underwent steroid taper starting in October 2018 (one mg per week), and his last dose of steroids was in the middle of December. Prior workup for systemic sarcoidosis include TTE and CT Chest done here in 10/2018 without features to suggest cardiac sarcoidosis or pulmonary involvement. Currently is on methotrexate 20mg weekly, minocycline 100mg BID, and prn betamethasone ointment.     Patient has difficulty discerning whether his inability to ambulate far distances is from his shortness of breath versus pain from his back and hips.  patient first felt short of breath in 2016, and underwent PCI in April 2016 with improvement of his symptoms.  Over  the last several years has increasing back pain and hip pain which has limited his ability to exercise.  His significant other notes that he appears to be breathing harder when he exerts himself, especially when going up a flight of stairs.  He previously was able to walk 9 holes of golf, but is unable to do so now, but again is unsure if that is due to the pain.  Denies any shortness of breath with laying flat or with talking.    A significant other also notes that patient has been having more difficulty sleeping.  Is only getting 4-5 hours of sleep at night, mostly due to discomfort at night.  He also snores and she has witnessed apneic episodes.  Naps often during the day.  No prior sleep study.    10 point ROS performed and negative except for as noted in HPI.         Past Medical History:     CAD  Cutaneous Sarcoidosis  HTN  HL         Past Surgical History:      Hernia repair  Lumbar fusion         Social History:     Retired .  Retired in .  Smoked for 4 years 1 pack/day, quit 50 years ago.  No pets in the home.  No mold or dust exposure.         Family History:     No family history of lung disease.         Allergies:   No Known Allergies          Medications:     Current Outpatient Medications on File Prior to Visit:  betamethasone dipropionate (DIPROSONE) 0.05 % ointment Apply topically 2 times daily   Calcium Carb-Cholecalciferol 600-800 MG-UNIT TABS Take 1 tablet orally once daily.   clopidogrel (PLAVIX) 75 MG tablet Take 75 mg by mouth   folic acid (FOLVITE) 1 MG tablet    glucosamine-chondroitin 500-400 MG CAPS per capsule Take 2 capsules by mouth   [] lidocaine 1% with EPINEPHrine 1:100,000 1 %-1:442144 injection Inject 1 mL into the skin once for 1 dose   [] lidocaine 1% with EPINEPHrine 1:100,000 1 %-1:901600 injection Inject 3 mLs into the skin once for 1 dose   lisinopril (PRINIVIL/ZESTRIL) 10 MG tablet    methotrexate sodium 2.5 MG TABS Take 8 tablets (20 mg)  "by mouth once a week   metoprolol tartrate (LOPRESSOR) 25 MG tablet    minocycline (MINOCIN/DYNACIN) 50 MG capsule Take 2 capsules (100 mg) by mouth 2 times daily   nitroGLYcerin (NITROSTAT) 0.4 MG sublingual tablet Place 0.4 mg under the tongue   Omega-3 Fatty Acids (FISH OIL PO) Take 1 capsule by mouth   omeprazole (PRILOSEC) 20 MG CR capsule    predniSONE (DELTASONE) 1 MG tablet Take 10 mg daily for 1 week, 9 mg daily for 1 week, 8 mg daily for 1 week, continue taper 1 mg decrease per week until off   predniSONE (DELTASONE) 5 MG tablet Steroid taper, 10 mg daily for 1 week, 9 mg daily for 1 week, continue 1 mg decrease per week until off   SIMVASTATIN PO    tacrolimus (PROTOPIC) 0.1 % ointment Apply topically 2 times daily   triamcinolone (KENALOG) 0.1 % cream      No current facility-administered medications on file prior to visit.            Exam:   /84   Pulse 62   Resp 17   Ht 1.727 m (5' 8\")   Wt 102.1 kg (225 lb)   SpO2 98%   BMI 34.21 kg/m    on RA  General: Well-appearing, no acute distress  HEENT: Oropharynx clear, no scleral icterus  Heart: Normal rate, regular rhythm  Lungs: Clear to auscultation bilaterally with good air movement  Skin: Area of discoloration on the right mid sole of his foot.  Extremities: No edema or clubbing  Psych: Normal affect  Neuro: Answer questions appropriately         Data:   TTE 10/2018  Global and regional left ventricular function is normal with an EF of 55-60%.  Global right ventricular function is normal.  No significant valvular abnormalities.  The inferior vena cava was normal in size with preserved respiratory  variability.  No pericardial effusion is present.  Previous study not available for comparison.    CT Chest 10/2018  1. Sequela of granulomatous disease  2. No focal consolidation, pleural effusion, or pneumothorax.  3. Incidental noted moderate to severe three-vessel coronary artery  Calcification    CXR 2/15/19  Personally reviewed. Lung fields " clear.     PFT: 2/15/19    Personally interpreted: Moderate airflow obstruction with significant bronchodilator response.  Normal diffusion capacity.  Air trapping present.           Assessment and Plan:     Benji Fields is a 68 year old male with history of cutaneous sarcoidosis, HTN, HL, and CAD who presents for evaluation of dyspnea.    Dyspnea on Exertion  Airflow Obstruction with Reversibility  Hx of Cutaneous Sarcoidosis  It's unclear how much of patient's exertional capacity is limited by hip and back pain versus shortness of breath. Patient does not think his SOB is particularly problematic. He does have moderate obstruction on his PFTs today with reversibility. Differential includes asthma vs small airways disease secondary to sarcoidosis. It would be less common to have airflow obstruction as the only presentation of pulmonary sarcoidosis without other CT Chest findings such as LAD or infiltrates and disease course would follow along with the disease course of his skin. Treatment would also be the same as his current treatment for his cutaneous sarcoid.  --Will start treatment for asthma with Symbicort and reassess in 3 months.  --6MWT today  --Repeat PFTs and 6MWT    Routine Health Maintenance  --Flu shot and PCV13 today, PPSV23 in 2/2020    Possible Sleep Apnea  STOP-BANG of at least 5. Will discuss with patient at next visit regarding sleep medicine referral.    Patient staffed with Dr. Ward. Return to clinic in 3 months.    Loreto Graham  Pulmonary and Critical Care Fellow    Physician Attestation   I, Elliot Ward, saw this patient and agree with the findings and plan of care as documented in the note.      Items personally reviewed/procedural attestation: vitals, labs, imaging and agree with the interpretation documented in the note and spirometry report and agree with the interpretation documented in the note.  Benji Fields is a 68 year old male with history of cutaneous sarcoidosis, HTN, HL,  "and CAD who presents for evaluation of dyspnea.  /84   Pulse 62   Resp 17   Ht 1.727 m (5' 8\")   Wt 102.1 kg (225 lb)   SpO2 98%   BMI 34.21 kg/m   on RA  General: Well-appearing, no acute distress  HEENT: Oropharynx clear, no scleral icterus  Heart: Normal rate, regular rhythm  Lungs: Clear to auscultation bilaterally with good air movement  Skin: Area of discoloration on the right mid sole of his foot.  Extremities: No edema or clubbing  Psych: Normal affect  Neuro: Answer questions appropriately  Dyspnea on Exertion  Airflow Obstruction with Reversibility  Hx of Cutaneous Sarcoidosis  It's unclear how much of patient's exertional capacity is limited by hip and back pain versus shortness of breath. Patient does not think his SOB is particularly problematic. He does have moderate obstruction on his PFTs today with reversibility. Differential includes asthma vs small airways disease secondary to sarcoidosis. It would be less common to have airflow obstruction as the only presentation of pulmonary sarcoidosis without other CT Chest findings such as LAD or infiltrates and disease course would follow along with the disease course of his skin. Treatment would also be the same as his current treatment for his cutaneous sarcoid.  --Will start treatment for asthma with Symbicort and reassess in 3 months.  --6MWT today  --Repeat PFTs and 6MWT     Routine Health Maintenance  --Flu shot and PCV13 today, PPSV23 in 2/2020     Possible Sleep Apnea  STOP      Elliot Ward MD    Again, thank you for allowing me to participate in the care of your patient.      Sincerely,    Loreto Graham MD      "

## 2019-02-15 NOTE — NURSING NOTE
Flu vaccination given today in right arm. Patient tolerated well.  Kianna Bryant CMA  Given patient [id number] in right deltoid IM. Cleaned site with alcohol, and applied bandage. Pt tolerated injection well  Kianna Bryant CMA

## 2019-02-19 LAB
DLCOUNC-%PRED-PRE: 94 %
DLCOUNC-PRE: 23.68 ML/MIN/MMHG
DLCOUNC-PRED: 24.99 ML/MIN/MMHG
ERV-%PRED-PRE: 45 %
ERV-PRE: 0.28 L
ERV-PRED: 0.62 L
EXPTIME-PRE: 9.43 SEC
FEF2575-%PRED-POST: 72 %
FEF2575-%PRED-PRE: 43 %
FEF2575-POST: 1.82 L/SEC
FEF2575-PRE: 1.09 L/SEC
FEF2575-PRED: 2.53 L/SEC
FEFMAX-%PRED-PRE: 58 %
FEFMAX-PRE: 4.89 L/SEC
FEFMAX-PRED: 8.29 L/SEC
FEV1-%PRED-PRE: 66 %
FEV1-PRE: 2.09 L
FEV1FEV6-PRE: 59 %
FEV1FEV6-PRED: 78 %
FEV1FVC-PRE: 57 %
FEV1FVC-PRED: 77 %
FEV1SVC-PRE: 53 %
FEV1SVC-PRED: 70 %
FIFMAX-PRE: 2.71 L/SEC
FIO2-PRE: 21 %
FRCPLETH-%PRED-PRE: 109 %
FRCPLETH-PRE: 3.89 L
FRCPLETH-PRED: 3.55 L
FVC-%PRED-PRE: 89 %
FVC-PRE: 3.69 L
FVC-PRED: 4.11 L
IC-%PRED-PRE: 93 %
IC-PRE: 3.64 L
IC-PRED: 3.88 L
RVPLETH-%PRED-PRE: 145 %
RVPLETH-PRE: 3.61 L
RVPLETH-PRED: 2.48 L
TLCPLETH-%PRED-PRE: 112 %
TLCPLETH-PRE: 7.54 L
TLCPLETH-PRED: 6.72 L
VA-%PRED-PRE: 97 %
VA-PRE: 5.88 L
VC-%PRED-PRE: 87 %
VC-PRE: 3.93 L
VC-PRED: 4.5 L

## 2019-03-19 ENCOUNTER — OFFICE VISIT (OUTPATIENT)
Dept: DERMATOLOGY | Facility: CLINIC | Age: 69
End: 2019-03-19
Payer: COMMERCIAL

## 2019-03-19 DIAGNOSIS — Z79.631 ON METHOTREXATE THERAPY: ICD-10-CM

## 2019-03-19 DIAGNOSIS — D86.3 CUTANEOUS SARCOIDOSIS (H): ICD-10-CM

## 2019-03-19 DIAGNOSIS — D86.3 CUTANEOUS SARCOIDOSIS (H): Primary | ICD-10-CM

## 2019-03-19 DIAGNOSIS — R21 RASH: ICD-10-CM

## 2019-03-19 LAB
ALBUMIN SERPL-MCNC: 3.4 G/DL (ref 3.4–5)
ALP SERPL-CCNC: 100 U/L (ref 40–150)
ALT SERPL W P-5'-P-CCNC: 24 U/L (ref 0–70)
ANION GAP SERPL CALCULATED.3IONS-SCNC: 6 MMOL/L (ref 3–14)
AST SERPL W P-5'-P-CCNC: 23 U/L (ref 0–45)
BASOPHILS # BLD AUTO: 0 10E9/L (ref 0–0.2)
BASOPHILS NFR BLD AUTO: 0.6 %
BILIRUB SERPL-MCNC: 0.5 MG/DL (ref 0.2–1.3)
BUN SERPL-MCNC: 27 MG/DL (ref 7–30)
CALCIUM SERPL-MCNC: 8.9 MG/DL (ref 8.5–10.1)
CHLORIDE SERPL-SCNC: 108 MMOL/L (ref 94–109)
CO2 SERPL-SCNC: 27 MMOL/L (ref 20–32)
CREAT SERPL-MCNC: 1.21 MG/DL (ref 0.66–1.25)
DIFFERENTIAL METHOD BLD: ABNORMAL
EOSINOPHIL # BLD AUTO: 0.2 10E9/L (ref 0–0.7)
EOSINOPHIL NFR BLD AUTO: 3.6 %
ERYTHROCYTE [DISTWIDTH] IN BLOOD BY AUTOMATED COUNT: 18.8 % (ref 10–15)
GFR SERPL CREATININE-BSD FRML MDRD: 62 ML/MIN/{1.73_M2}
GLUCOSE SERPL-MCNC: 115 MG/DL (ref 70–99)
HCT VFR BLD AUTO: 40.5 % (ref 40–53)
HGB BLD-MCNC: 12 G/DL (ref 13.3–17.7)
IMM GRANULOCYTES # BLD: 0 10E9/L (ref 0–0.4)
IMM GRANULOCYTES NFR BLD: 0.6 %
LYMPHOCYTES # BLD AUTO: 0.7 10E9/L (ref 0.8–5.3)
LYMPHOCYTES NFR BLD AUTO: 15.2 %
MCH RBC QN AUTO: 26.1 PG (ref 26.5–33)
MCHC RBC AUTO-ENTMCNC: 29.6 G/DL (ref 31.5–36.5)
MCV RBC AUTO: 88 FL (ref 78–100)
MONOCYTES # BLD AUTO: 0.7 10E9/L (ref 0–1.3)
MONOCYTES NFR BLD AUTO: 15.8 %
NEUTROPHILS # BLD AUTO: 3 10E9/L (ref 1.6–8.3)
NEUTROPHILS NFR BLD AUTO: 64.2 %
NRBC # BLD AUTO: 0 10*3/UL
NRBC BLD AUTO-RTO: 0 /100
PLATELET # BLD AUTO: 224 10E9/L (ref 150–450)
POTASSIUM SERPL-SCNC: 4.3 MMOL/L (ref 3.4–5.3)
PROT SERPL-MCNC: 6.7 G/DL (ref 6.8–8.8)
RBC # BLD AUTO: 4.59 10E12/L (ref 4.4–5.9)
SODIUM SERPL-SCNC: 140 MMOL/L (ref 133–144)
WBC # BLD AUTO: 4.7 10E9/L (ref 4–11)

## 2019-03-19 ASSESSMENT — PAIN SCALES - GENERAL: PAINLEVEL: NO PAIN (0)

## 2019-03-19 NOTE — LETTER
3/19/2019       RE: Benji Fields  [address] [facility] [address]     Dear Colleague,    Thank you for referring your patient, Benji Fields, to the [facility]ER[facility] at [facility]. Please see a copy of my visit note below.    [facility] Dermatology Note      Dermatology Problem List:  1. 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 He states no changes since his last visit.          History of Present Illness:  Mr. Benji Fields is a 68 year old male who presents as a follow-up for cutaneous sarcoidosis rash. The patient was last seen 12/18/2019 when he completed his steroid taper and was continued on 20mg methotrexate weekly, 1 mg folic acid and 100 mg BID minocycline. Since his last visit he has noted minor improvement of his cutaneous symptoms and has not had any new or worsening lesions. He denies pain unless he is walking around barefoot on hard floors or for extended periods of time. He was also seen by the pulmonologist and prescribed an albuterol inhaler for his mild dyspnea. He denies any side effects from his medications and is otherwise feeling well.    Past Medical History:   Patient Active Problem List   Diagnosis     Cutaneous sarcoidosis     Past Medical History:   Diagnosis Date     High cholesterol      Hypertension      Past Surgical History:   Procedure Laterality Date     HERNIA REPAIR         Social History:  Patient reports that  has never smoked. he has never used smokeless tobacco.    Family History:  Family History   Problem Relation Age of Onset     Melanoma No family hx of      Skin  Cancer No family hx of        Medications:  Current Outpatient Medications   Medication Sig Dispense Refill     acetaminophen (TYLENOL) 500 MG tablet Take 500-1,000 mg by mouth every 6 hours as needed for mild pain       aspirin 81 MG EC tablet Take 81 mg by mouth daily       betamethasone dipropionate (DIPROSONE) 0.05 % ointment Apply topically 2 times daily 50 g 3     budesonide-formoterol (SYMBICORT) 80-4.5 MCG/ACT Inhaler Inhale 2 puffs into the lungs 2 times daily 1 Inhaler 3     Calcium Carb-Cholecalciferol 600-800 MG-UNIT TABS Take 1 tablet orally once daily.       folic acid (FOLVITE) 1 MG tablet        glucosamine-chondroitin 500-400 MG CAPS per capsule Take 2 capsules by mouth       lisinopril (PRINIVIL/ZESTRIL) 10 MG tablet        meloxicam (MOBIC) 15 MG tablet Take 15 mg by mouth daily       methotrexate sodium 2.5 MG TABS Take 8 tablets (20 mg) by mouth once a week 32 tablet 3     metoprolol tartrate (LOPRESSOR) 25 MG tablet        minocycline (MINOCIN/DYNACIN) 50 MG capsule Take 2 capsules (100 mg) by mouth 2 times daily 120 capsule 3     nitroGLYcerin (NITROSTAT) 0.4 MG sublingual tablet Place 0.4 mg under the tongue       Omega-3 Fatty Acids (FISH OIL PO) Take 1 capsule by mouth       omeprazole (PRILOSEC) 20 MG CR capsule        rosuvastatin (CRESTOR) 40 MG tablet Take 40 mg by mouth daily       triamterene-HCTZ (MAXZIDE-25) 37.5-25 MG tablet Take 1 tablet by mouth daily       No Known Allergies      Review of Systems:  -As per HPI  -Constitutional: Otherwise feeling well today, in usual state of health.  -HEENT: Patient denies nonhealing oral sores.  -Skin: As above in HPI. No additional skin concerns.    Physical exam:  Vitals: There were no vitals taken for this visit.  GEN: This is a well developed, well-nourished male in no acute distress, in a pleasant mood.    SKIN: Focused examination of the feet was performed.  - R foot with a defined purple patch and minimal scaling extending from arch to  mid plantar surface. Compared to prior photos, continues to improve  - L foot with smaller patch along lateral aspect, no plantar involvement. More pronounced scaling.   - Palpable indentation noted  -No other lesions of concern on areas examined.     Impression/Plan:  1. Cutaneous Sarcoidosis, limited to bilateral feet and improving since last visit. Biopsy confirmed granulomatous dermatitis, AFB negative. Was originally treated with steroids, completed taper successfully and now stable on current management. Continues to improve. No systemic evidence of disease at this time, and no new flares. PFTs will be repeated in 2 months after albuterol inhaler use.    Will continue Methotrexate 20 mg weekly with 1 mg folic acid on days not taking methotrexate    Approximate methotrexate cumulative dose (per records started 1/2018) - 1300mg     Will repeat labs today, CBC and CMP, last monitored in Dec 2018.    Will discontinue Minocycline as this is likely not contributing largely to disease control, patient is agreeable to this change.    Repeat PFTs in 2 months, dyspnea is well controlled    CC Byron Gaitan MD  [address] [place], [place] [phone] on close of this encounter.    Follow-up in 3 months, earlier for new or changing lesions.     Staff Involved:  I,Jenifer Guerrero MS4, saw and examined the patient in the presence of Dr. Gaitan.       Staff Physician:  I was present with the medical student who participated in the service and in the documentation of the note. I have verified the history and personally performed the physical exam and medical decision making. I agree with the assessment and plan of care as documented in the note.     Byron Gaitan MD  Staff Dermatologist and Dermatopathologist  , Department of Dermatology

## 2019-03-19 NOTE — PROGRESS NOTES
[facility] Dermatology Note      Dermatology Problem List:  1. 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 He states no changes since his last visit.          History of Present Illness:  Mr. Benji Fields is a 68 year old male who presents as a follow-up for cutaneous sarcoidosis rash. The patient was last seen 12/18/2019 when he completed his steroid taper and was continued on 20mg methotrexate weekly, 1 mg folic acid and 100 mg BID minocycline. Since his last visit he has noted minor improvement of his cutaneous symptoms and has not had any new or worsening lesions. He denies pain unless he is walking around barefoot on hard floors or for extended periods of time. He was also seen by the pulmonologist and prescribed an albuterol inhaler for his mild dyspnea. He denies any side effects from his medications and is otherwise feeling well.    Past Medical History:   Patient Active Problem List   Diagnosis     Cutaneous sarcoidosis     Past Medical History:   Diagnosis Date     High cholesterol      Hypertension      Past Surgical History:   Procedure Laterality Date     HERNIA REPAIR         Social History:  Patient reports that  has never smoked. he has never used smokeless tobacco.    Family History:  Family History   Problem Relation Age of Onset     Melanoma No family hx of      Skin Cancer No family hx of        Medications:  Current Outpatient Medications   Medication Sig Dispense Refill     acetaminophen (TYLENOL) 500 MG tablet Take 500-1,000 mg by mouth every 6 hours as needed for mild pain       aspirin 81 MG EC tablet Take 81 mg by mouth daily        betamethasone dipropionate (DIPROSONE) 0.05 % ointment Apply topically 2 times daily 50 g 3     budesonide-formoterol (SYMBICORT) 80-4.5 MCG/ACT Inhaler Inhale 2 puffs into the lungs 2 times daily 1 Inhaler 3     Calcium Carb-Cholecalciferol 600-800 MG-UNIT TABS Take 1 tablet orally once daily.       folic acid (FOLVITE) 1 MG tablet        glucosamine-chondroitin 500-400 MG CAPS per capsule Take 2 capsules by mouth       lisinopril (PRINIVIL/ZESTRIL) 10 MG tablet        meloxicam (MOBIC) 15 MG tablet Take 15 mg by mouth daily       methotrexate sodium 2.5 MG TABS Take 8 tablets (20 mg) by mouth once a week 32 tablet 3     metoprolol tartrate (LOPRESSOR) 25 MG tablet        minocycline (MINOCIN/DYNACIN) 50 MG capsule Take 2 capsules (100 mg) by mouth 2 times daily 120 capsule 3     nitroGLYcerin (NITROSTAT) 0.4 MG sublingual tablet Place 0.4 mg under the tongue       Omega-3 Fatty Acids (FISH OIL PO) Take 1 capsule by mouth       omeprazole (PRILOSEC) 20 MG CR capsule        rosuvastatin (CRESTOR) 40 MG tablet Take 40 mg by mouth daily       triamterene-HCTZ (MAXZIDE-25) 37.5-25 MG tablet Take 1 tablet by mouth daily       No Known Allergies      Review of Systems:  -As per HPI  -Constitutional: Otherwise feeling well today, in usual state of health.  -HEENT: Patient denies nonhealing oral sores.  -Skin: As above in HPI. No additional skin concerns.    Physical exam:  Vitals: There were no vitals taken for this visit.  GEN: This is a well developed, well-nourished male in no acute distress, in a pleasant mood.    SKIN: Focused examination of the feet was performed.  - R foot with a defined purple patch and minimal scaling extending from arch to mid plantar surface. Compared to prior photos, continues to improve  - L foot with smaller patch along lateral aspect, no plantar involvement. More pronounced scaling.   - Palpable indentation noted  -No other lesions of concern on areas examined.      Impression/Plan:  1. Cutaneous Sarcoidosis, limited to bilateral feet and improving since last visit. Biopsy confirmed granulomatous dermatitis, AFB negative. Was originally treated with steroids, completed taper successfully and now stable on current management. Continues to improve. No systemic evidence of disease at this time, and no new flares. PFTs will be repeated in 2 months after albuterol inhaler use.    Will continue Methotrexate 20 mg weekly with 1 mg folic acid on days not taking methotrexate    Approximate methotrexate cumulative dose (per records started 1/2018) - 1300mg     Will repeat labs today, CBC and CMP, last monitored in Dec 2018.    Will discontinue Minocycline as this is likely not contributing largely to disease control, patient is agreeable to this change.    Repeat PFTs in 2 months, dyspnea is well controlled    CC Byron Gaitan MD  [address] [place], [place] [phone] on close of this encounter.    Follow-up in 3 months, earlier for new or changing lesions.     Staff Involved:  I,Jenifer Guerrero MS4, saw and examined the patient in the presence of Dr. Gaitan.       Staff Physician:  I was present with the medical student who participated in the service and in the documentation of the note. I have verified the history and personally performed the physical exam and medical decision making. I agree with the assessment and plan of care as documented in the note.     Byron Gaitan MD  Staff Dermatologist and Dermatopathologist  , Department of Dermatology

## 2019-03-19 NOTE — NURSING NOTE
Chief Complaint   Patient presents with     Derm Problem     Benji is here for a follow up appt. He states no changes since his last visit.      Leatha Casanova LPN

## 2019-03-20 LAB
ACE SERPL-CCNC: 6 U/L (ref 9–67)
GAMMA INTERFERON BACKGROUND BLD IA-ACNC: 0.04 IU/ML
M TB IFN-G BLD-IMP: NEGATIVE
M TB IFN-G CD4+ BCKGRND COR BLD-ACNC: >10 IU/ML
MITOGEN IGNF BCKGRD COR BLD-ACNC: 0 IU/ML
MITOGEN IGNF BCKGRD COR BLD-ACNC: 0 IU/ML

## 2019-03-21 RX ORDER — METHOTREXATE 2.5 MG/1
20 TABLET ORAL WEEKLY
Qty: 32 TABLET | Refills: 3 | Status: SHIPPED | OUTPATIENT
Start: 2019-03-21 | End: 2019-07-30

## 2019-06-19 ENCOUNTER — TELEPHONE (OUTPATIENT)
Dept: DERMATOLOGY | Facility: CLINIC | Age: 69
End: 2019-06-19

## 2019-06-19 NOTE — TELEPHONE ENCOUNTER
Spoke with patient and informed him to reach out to the provider that had prescribed the medication originally since this is not typically a dermatology medication. Informed patient that I would still send his message to  to review since he was concerned that  may have an opinion regarding him taking this medication. Patient verbalized understanding.

## 2019-06-19 NOTE — TELEPHONE ENCOUNTER
[facility] Call Center    Phone Message    May a detailed message be left on voicemail: yes    Reason for Call: Medication Question or concern regarding medication   Prescription Clarification  Name of Medication: Omeprazole  Prescribing Provider: Nura Little MD   Pharmacy: ?   What on the order needs clarification? The pt is taking omeprazole from another MD and is wondering if he should still be taking it. He was taking it along with another RX, but isn't taking that other med any longer. Please call the pt to discuss. Thanks.          Action Taken: Message routed to:  [facility]

## 2019-06-25 NOTE — TELEPHONE ENCOUNTER
Spoke with Benji and informed that Dr. Gaitan does not have an opinion on this medication as he does not prescribe it. Benji notes that he spoke with his other provider.

## 2019-07-30 ENCOUNTER — OFFICE VISIT (OUTPATIENT)
Dept: DERMATOLOGY | Facility: CLINIC | Age: 69
End: 2019-07-30
Payer: COMMERCIAL

## 2019-07-30 DIAGNOSIS — D86.3 CUTANEOUS SARCOIDOSIS (H): ICD-10-CM

## 2019-07-30 LAB
ALBUMIN SERPL-MCNC: 3.9 G/DL (ref 3.4–5)
ALP SERPL-CCNC: 88 U/L (ref 40–150)
ALT SERPL W P-5'-P-CCNC: 29 U/L (ref 0–70)
ANION GAP SERPL CALCULATED.3IONS-SCNC: 4 MMOL/L (ref 3–14)
AST SERPL W P-5'-P-CCNC: 21 U/L (ref 0–45)
BASOPHILS # BLD AUTO: 0 10E9/L (ref 0–0.2)
BASOPHILS NFR BLD AUTO: 0.5 %
BILIRUB SERPL-MCNC: 0.6 MG/DL (ref 0.2–1.3)
BUN SERPL-MCNC: 22 MG/DL (ref 7–30)
CALCIUM SERPL-MCNC: 8.9 MG/DL (ref 8.5–10.1)
CHLORIDE SERPL-SCNC: 106 MMOL/L (ref 94–109)
CO2 SERPL-SCNC: 28 MMOL/L (ref 20–32)
CREAT SERPL-MCNC: 1.04 MG/DL (ref 0.66–1.25)
DIFFERENTIAL METHOD BLD: ABNORMAL
EOSINOPHIL # BLD AUTO: 0.1 10E9/L (ref 0–0.7)
EOSINOPHIL NFR BLD AUTO: 2 %
ERYTHROCYTE [DISTWIDTH] IN BLOOD BY AUTOMATED COUNT: 18.7 % (ref 10–15)
GFR SERPL CREATININE-BSD FRML MDRD: 74 ML/MIN/{1.73_M2}
GLUCOSE SERPL-MCNC: 105 MG/DL (ref 70–99)
HCT VFR BLD AUTO: 42.2 % (ref 40–53)
HGB BLD-MCNC: 12.9 G/DL (ref 13.3–17.7)
IMM GRANULOCYTES # BLD: 0 10E9/L (ref 0–0.4)
IMM GRANULOCYTES NFR BLD: 0.3 %
LYMPHOCYTES # BLD AUTO: 0.8 10E9/L (ref 0.8–5.3)
LYMPHOCYTES NFR BLD AUTO: 12.5 %
MAGNESIUM SERPL-MCNC: 2.4 MG/DL (ref 1.6–2.3)
MCH RBC QN AUTO: 27.6 PG (ref 26.5–33)
MCHC RBC AUTO-ENTMCNC: 30.6 G/DL (ref 31.5–36.5)
MCV RBC AUTO: 90 FL (ref 78–100)
MONOCYTES # BLD AUTO: 1 10E9/L (ref 0–1.3)
MONOCYTES NFR BLD AUTO: 16.6 %
NEUTROPHILS # BLD AUTO: 4.2 10E9/L (ref 1.6–8.3)
NEUTROPHILS NFR BLD AUTO: 68.1 %
NRBC # BLD AUTO: 0 10*3/UL
NRBC BLD AUTO-RTO: 0 /100
PLATELET # BLD AUTO: 230 10E9/L (ref 150–450)
POTASSIUM SERPL-SCNC: 4.2 MMOL/L (ref 3.4–5.3)
PROT SERPL-MCNC: 7.4 G/DL (ref 6.8–8.8)
RBC # BLD AUTO: 4.67 10E12/L (ref 4.4–5.9)
SODIUM SERPL-SCNC: 138 MMOL/L (ref 133–144)
WBC # BLD AUTO: 6.1 10E9/L (ref 4–11)

## 2019-07-30 RX ORDER — METHOTREXATE 2.5 MG/1
20 TABLET ORAL WEEKLY
Qty: 32 TABLET | Refills: 3 | Status: SHIPPED | OUTPATIENT
Start: 2019-07-30 | End: 2019-11-25

## 2019-07-30 RX ORDER — FOLIC ACID 1 MG/1
1 TABLET ORAL DAILY
Qty: 30 TABLET | Refills: 11 | Status: SHIPPED | OUTPATIENT
Start: 2019-07-30

## 2019-07-30 ASSESSMENT — PAIN SCALES - GENERAL: PAINLEVEL: NO PAIN (0)

## 2019-07-30 NOTE — PROGRESS NOTES
[facility] Dermatology Note      Dermatology Problem List:  1. 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     History of Present Illness:  Mr. Benji Fields is a 69 year old male who presents as a follow-up for cutaneous sarcoid of the bilateral feet. The patient was last seen 3/19/19 when he was continued on MTX 20 mg per week with folic acid 1 mg per day (approximately 1400 cumulative dose), labs were drawn (CBC with mild anemia; CMP/quant gold WNL), and minocycline was discontinued. Today he is doing about the same. He does not note any worsening or improvement since his last visit. It is still a little sore in his great/2nd toe on the right when he walks. He notes he has not been taking his folic acid and is unsure if he ever took this. He denies any abdominal pain, fever, night sweats, shortness of breath, sores in the mouth on the methotrexate. He was scheduled to have PFTs in May 2019, however, he had hip surgery and did not have this done. He does not use any topicals.     Past Medical History:   Patient Active Problem List   Diagnosis     Cutaneous sarcoidosis     Past Medical History:   Diagnosis Date     High cholesterol      Hypertension      Past Surgical History:   Procedure Laterality Date     HERNIA REPAIR         Social History:  Patient reports that he has never smoked. He has never used smokeless tobacco.    Family History:  Family History   Problem Relation Age of Onset     Melanoma No family hx of      Skin Cancer No family hx of        Medications:  Current Outpatient Medications   Medication Sig Dispense Refill      acetaminophen (TYLENOL) 500 MG tablet Take 500-1,000 mg by mouth every 6 hours as needed for mild pain       aspirin 81 MG EC tablet Take 81 mg by mouth daily       B Complex Vitamins (VITAMIN B COMPLEX PO) Take 1 capsule by mouth       betamethasone dipropionate (DIPROSONE) 0.05 % ointment Apply topically 2 times daily 50 g 3     budesonide-formoterol (SYMBICORT) 80-4.5 MCG/ACT Inhaler Inhale 2 puffs into the lungs 2 times daily 1 Inhaler 3     Calcium Carb-Cholecalciferol 600-800 MG-UNIT TABS Take 1 tablet orally once daily.       glucosamine-chondroitin 500-400 MG CAPS per capsule Take 2 capsules by mouth       lisinopril (PRINIVIL/ZESTRIL) 10 MG tablet        meloxicam (MOBIC) 15 MG tablet Take 15 mg by mouth daily       methotrexate sodium 2.5 MG TABS Take 8 tablets (20 mg) by mouth once a week 32 tablet 3     metoprolol tartrate (LOPRESSOR) 25 MG tablet        nitroGLYcerin (NITROSTAT) 0.4 MG sublingual tablet Place 0.4 mg under the tongue       Omega-3 Fatty Acids (FISH OIL PO) Take 1 capsule by mouth       omeprazole (PRILOSEC) 20 MG CR capsule        rosuvastatin (CRESTOR) 40 MG tablet Take 40 mg by mouth daily       triamterene-HCTZ (MAXZIDE-25) 37.5-25 MG tablet Take 1 tablet by mouth daily       folic acid (FOLVITE) 1 MG tablet        minocycline (MINOCIN/DYNACIN) 50 MG capsule Take 2 capsules (100 mg) by mouth 2 times daily (Patient not taking: Reported on 7/30/2019) 120 capsule 3     No Known Allergies      Review of Systems:  -As per HPI.   -Constitutional: Otherwise feeling well today, in usual state of health.  -HEENT: Patient denies nonhealing oral sores.  -Skin: As above in HPI. No additional skin concerns.    Physical exam:  Vitals: There were no vitals taken for this visit.  GEN: This is a well developed, well-nourished male in no acute distress, in a pleasant mood.    SKIN: Focused examination of the bilateral feet was performed.  - R foot with a defined purple patch and minimal scaling  extending from arch to mid plantar surface. (Took updated photos today)  - L foot with smaller patch along lateral aspect, no plantar involvement. More pronounced scaling.   - Palpable indentation noted  - No other lesions of concern on areas examined.   - No other lesions of concern on areas examined.   - Bilateral dorsalis pedis pulses diminished, but palpable      Impression/Plan:  1. Cutaneous Sarcoidosis, limited to bilateral feet. Biopsy confirmed granulomatous dermatitis, AFB negative. Was originally treated with steroids, completed taper successfully and now stable on current management. Still has a bit of thickness to the plaque on the right foot. No systemic evidence of disease at this time, and no new flares. PFTs were not done in May, CXR in Feb 2019 unremarkable.    Will continue Methotrexate 20 mg weekly with 1 mg folic acid on days not taking methotrexate    Use betamethasone dipropionate 0.05 ointment as needed under saran wrap at night to try and further flatten the plaques    Approximate methotrexate cumulative dose (per records started 1/2018) - 1400mg     Will repeat labs today, CBC, CMP, Mg    Patient never had repeat PFTs, will cc pulmonology and defer to them if they would like to pursue this    2. Right 1st/2nd toe pain- not likely explained by his sarcoid. If the sarcoid lesion was impinging the medial plantar nerve, would expect symptoms in his 3rd/4th digits as well. Could consider Gonzalez's neuroma, but typically this is in a more lateral location such as between the 3rd and 4th metatarsal. Bilateral pedal pulses are intact, but weak. Discussed that his PCP is better suited to pursue this than dermatology.    Follow up with PCP      CC Byron Gaitan MD  [address] [place] [id number] on close of this encounter.    CC Loreto Graham MD on close of this encounter.    Follow-up in 6 months, earlier for new or changing lesions.     Staff Involved:  I,Niya Wade, jennifer and  examined the patient in the presence of Dr. Gaitan.    Staff Physician:  I was present with the medical student who participated in the service and in the documentation of the note. I have verified the history and personally performed the physical exam and medical decision making. I agree with the assessment and plan of care as documented in the note.     Byron Gaitan MD  Staff Dermatologist and Dermatopathologist  , Department of Dermatology

## 2019-07-30 NOTE — NURSING NOTE
Dermatology Rooming Note    Benji Fields's goals for this visit include:   Chief Complaint   Patient presents with     Derm Problem     Cutaneous Sarcoidosis - no change since his last visit.     Jodie Baez, CMA

## 2019-07-30 NOTE — LETTER
7/30/2019       RE: Benji Fields  [address] [facility] [address]     Dear Colleague,    Thank you for referring your patient, Benji Fields, to the [facility] DERMATOLOGY at [facility]. Please see a copy of my visit note below.    [facility] Dermatology Note      Dermatology Problem List:  1. 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     History of Present Illness:  Mr. Benji Fields is a 69 year old male who presents as a follow-up for cutaneous sarcoid of the bilateral feet. The patient was last seen 3/19/19 when he was continued on MTX 20 mg per week with folic acid 1 mg per day (approximately 1400 cumulative dose), labs were drawn (CBC with mild anemia; CMP/quant gold WNL), and minocycline was discontinued. Today he is doing about the same. He does not note any worsening or improvement since his last visit. It is still a little sore in his great/2nd toe on the right when he walks. He notes he has not been taking his folic acid and is unsure if he ever took this. He denies any abdominal pain, fever, night sweats, shortness of breath, sores in the mouth on the methotrexate. He was scheduled to have PFTs in May 2019, however, he had hip surgery and did not have this done. He does not use any topicals.     Past Medical History:   Patient Active Problem List   Diagnosis     Cutaneous sarcoidosis     Past Medical History:   Diagnosis Date     High cholesterol      Hypertension      Past Surgical History:   Procedure Laterality Date     HERNIA REPAIR         Social History:  Patient reports that he  has never smoked. He has never used smokeless tobacco.    Family History:  Family History   Problem Relation Age of Onset     Melanoma No family hx of      Skin Cancer No family hx of        Medications:  Current Outpatient Medications   Medication Sig Dispense Refill     acetaminophen (TYLENOL) 500 MG tablet Take 500-1,000 mg by mouth every 6 hours as needed for mild pain       aspirin 81 MG EC tablet Take 81 mg by mouth daily       B Complex Vitamins (VITAMIN B COMPLEX PO) Take 1 capsule by mouth       betamethasone dipropionate (DIPROSONE) 0.05 % ointment Apply topically 2 times daily 50 g 3     budesonide-formoterol (SYMBICORT) 80-4.5 MCG/ACT Inhaler Inhale 2 puffs into the lungs 2 times daily 1 Inhaler 3     Calcium Carb-Cholecalciferol 600-800 MG-UNIT TABS Take 1 tablet orally once daily.       glucosamine-chondroitin 500-400 MG CAPS per capsule Take 2 capsules by mouth       lisinopril (PRINIVIL/ZESTRIL) 10 MG tablet        meloxicam (MOBIC) 15 MG tablet Take 15 mg by mouth daily       methotrexate sodium 2.5 MG TABS Take 8 tablets (20 mg) by mouth once a week 32 tablet 3     metoprolol tartrate (LOPRESSOR) 25 MG tablet        nitroGLYcerin (NITROSTAT) 0.4 MG sublingual tablet Place 0.4 mg under the tongue       Omega-3 Fatty Acids (FISH OIL PO) Take 1 capsule by mouth       omeprazole (PRILOSEC) 20 MG CR capsule        rosuvastatin (CRESTOR) 40 MG tablet Take 40 mg by mouth daily       triamterene-HCTZ (MAXZIDE-25) 37.5-25 MG tablet Take 1 tablet by mouth daily       folic acid (FOLVITE) 1 MG tablet        minocycline (MINOCIN/DYNACIN) 50 MG capsule Take 2 capsules (100 mg) by mouth 2 times daily (Patient not taking: Reported on 7/30/2019) 120 capsule 3     No Known Allergies      Review of Systems:  -As per HPI.   -Constitutional: Otherwise feeling well today, in usual state of health.  -HEENT: Patient denies nonhealing oral sores.  -Skin: As above in HPI. No additional skin concerns.    Physical  exam:  Vitals: There were no vitals taken for this visit.  GEN: This is a well developed, well-nourished male in no acute distress, in a pleasant mood.    SKIN: Focused examination of the bilateral feet was performed.  - R foot with a defined purple patch and minimal scaling extending from arch to mid plantar surface. (Took updated photos today)  - L foot with smaller patch along lateral aspect, no plantar involvement. More pronounced scaling.   - Palpable indentation noted  - No other lesions of concern on areas examined.   - No other lesions of concern on areas examined.   - Bilateral dorsalis pedis pulses diminished, but palpable      Impression/Plan:  1. Cutaneous Sarcoidosis, limited to bilateral feet. Biopsy confirmed granulomatous dermatitis, AFB negative. Was originally treated with steroids, completed taper successfully and now stable on current management. Still has a bit of thickness to the plaque on the right foot. No systemic evidence of disease at this time, and no new flares. PFTs were not done in May, CXR in Feb 2019 unremarkable.    Will continue Methotrexate 20 mg weekly with 1 mg folic acid on days not taking methotrexate    Use betamethasone dipropionate 0.05 ointment as needed under saran wrap at night to try and further flatten the plaques    Approximate methotrexate cumulative dose (per records started 1/2018) - 1400mg     Will repeat labs today, CBC, CMP, Mg    Patient never had repeat PFTs, will cc pulmonology and defer to them if they would like to pursue this    2. Right 1st/2nd toe pain- not likely explained by his sarcoid. If the sarcoid lesion was impinging the medial plantar nerve, would expect symptoms in his 3rd/4th digits as well. Could consider Gonzalez's neuroma, but typically this is in a more lateral location such as between the 3rd and 4th metatarsal. Bilateral pedal pulses are intact, but weak. Discussed that his PCP is better suited to pursue this than dermatology.    Follow up  with PCP      CC Byron Gaitan MD  [address] [place] [address] on close of this encounter.    CC Loreto Graham MD on close of this encounter.    Follow-up in 6 months, earlier for new or changing lesions.     Staff Involved:  I,Niya Wade, saw and examined the patient in the presence of Dr. Gaitan.    Staff Physician:  I was present with the medical student who participated in the service and in the documentation of the note. I have verified the history and personally performed the physical exam and medical decision making. I agree with the assessment and plan of care as documented in the note.     Byron Gaitan MD  Staff Dermatologist and Dermatopathologist  , Department of Dermatology

## 2019-11-25 DIAGNOSIS — D86.3 CUTANEOUS SARCOIDOSIS (H): ICD-10-CM

## 2019-11-27 ENCOUNTER — TELEPHONE (OUTPATIENT)
Dept: DERMATOLOGY | Facility: CLINIC | Age: 69
End: 2019-11-27

## 2019-11-27 RX ORDER — METHOTREXATE 2.5 MG/1
20 TABLET ORAL WEEKLY
Qty: 32 TABLET | Refills: 3 | Status: SHIPPED | OUTPATIENT
Start: 2019-11-27

## 2019-11-27 NOTE — TELEPHONE ENCOUNTER
methotrexate sodium 2.5 mg tablet (RHEUMATREX)Take 8 tablets (20 mg) by mouth once a week   Last Written Prescription Date:  7/30/2019  Last Fill Quantity: 32,   # refills: 3  Last Office Visit: 7/30/2019  Future Office visit:  1/7/2020      Care Everywhere: 9/13/2019:  HGB=13.8  MCV=87  PLT= 232  MPV=10.6  CBC RESULTS [facility]:   Recent Labs   Lab Test 07/30/19  0851   WBC 6.1   RBC 4.67   HGB 12.9*   HCT 42.2   MCV 90   MCH 27.6   MCHC 30.6*   RDW 18.7*        CARE EVERYWHERE: 9/3/2019  Cr=1.04     Liver Function Studies       Recent Labs [facility]   Lab Test 07/30/19  0851   PROTTOTAL 7.4   ALBUMIN 3.9   BILITOTAL 0.6   ALKPHOS 88   AST 21   ALT 29       Routing refill request to provider for review/approval because:  DMARD

## 2019-11-27 NOTE — TELEPHONE ENCOUNTER
Received refill request for methotrexate 20 mg weekly as the resident on call. Reviewed patient's chart and attached communication. Patient last seen 7/30/19 for cutaneous sacoidosis. RTC 6m, scheduled 1/7/19 . After reviewing the medication list and assessment and plan from last visit, the refill request was accepted.    Joanna Smith PGY2  Dermatology Resident  pager

## 2020-01-07 ENCOUNTER — OFFICE VISIT (OUTPATIENT)
Dept: DERMATOLOGY | Facility: CLINIC | Age: 70
End: 2020-01-07
Payer: COMMERCIAL

## 2020-01-07 DIAGNOSIS — D86.3 CUTANEOUS SARCOIDOSIS (H): Primary | ICD-10-CM

## 2020-01-07 DIAGNOSIS — M79.671 RIGHT FOOT PAIN: ICD-10-CM

## 2020-01-07 ASSESSMENT — PAIN SCALES - GENERAL: PAINLEVEL: NO PAIN (0)

## 2020-01-07 NOTE — NURSING NOTE
Chief Complaint   Patient presents with     Derm Problem     Benji is here today for Sarcoidosis follow up. Benji notes his condition has been stable since last visit.      Leatha Casanova LPN

## 2020-01-07 NOTE — PATIENT INSTRUCTIONS
- get labs done next week   - can trial off the methotrexate, use the betamethasone cream up to 2x/day as needed   - take folic acid for 4 more days then stop   - if flare, give us a call and re-start taking methotrexate 20 mg every week with folic acid 1 mg on all days except methotrexate day   - recommend limits drinks to 1 drink/day max   - Can consider Humira if significant flare off methotrexate   - follow up with PCP about your foot pain

## 2020-01-07 NOTE — LETTER
1/7/2020     RE: Benji Fields  [address] [facility] [address]     Dear Colleague,    Thank you for referring your patient, Benji Fields, to the [facility] DERMATOLOGY at [facility]. Please see a copy of my visit note below.    [facility] Dermatology Note      Dermatology Problem List:  1. 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 Benji notes his condition has been stable since last visit.        History of Present Illness:  Mr. Benji Fields is a 69 year old male who presents for follow-up of cutaneous sarcoidosis.  Patient was last seen on 7/30/2019 when he was continued on methotrexate 20 mg weekly with 1 mg folic acid on days not taking methotrexate.  He was also continued on betamethasone 0.05% ointment as needed under Saran wrap at night.    Labs including CBC, BMP, and Mg on 7/30/19 reviewed and reassuring.     Today, patient reports that he is continuing to take methotrexate 20 mg weekly as well as folic acid 1 mg every day including on methotrexate days.  He denies any tongue burning, nonhealing oral ulcers, difficulty breathing, fevers, chills, unintended weight loss, or GI distress.  He drinks alcohol, sometimes up to 3 beers, several times a week.    He applied betamethasone cream to his bilateral feet under occlusion for 1 week after his last appointment, but then stopped as he did not feel an (< 1x/week) improvement.     He notes that he continues to have some tenderness in his right foot that extends  from a plaque on his plantar right foot up to the space between the first and the second digit.  He was unable to follow-up with his PCP regarding this issue as discussed during last appointment.  He also notes that the plaque on his right foot will sometimes become irritated, this resolves with betamethasone cream use.     He has no other concerns today.    PFTs 2/15/19  Testing did not meet ATS criteria.   Moderate obstructive lung disease with significant bronchodilator response.   Normal diffusion and lung volumes, but RV is increased suggestive of air trapping.     Past Medical History:   Patient Active Problem List   Diagnosis     Cutaneous sarcoidosis     Past Medical History:   Diagnosis Date     High cholesterol      Hypertension      Past Surgical History:   Procedure Laterality Date     HERNIA REPAIR         Social History:  Patient reports that he has never smoked. He has never used smokeless tobacco.    Family History:  Family History   Problem Relation Age of Onset     Melanoma No family hx of      Skin Cancer No family hx of        Medications:  Current Outpatient Medications   Medication Sig Dispense Refill     acetaminophen (TYLENOL) 500 MG tablet Take 500-1,000 mg by mouth every 6 hours as needed for mild pain       aspirin 81 MG EC tablet Take 81 mg by mouth daily       B Complex Vitamins (VITAMIN B COMPLEX PO) Take 1 capsule by mouth       betamethasone dipropionate (DIPROSONE) 0.05 % ointment Apply topically 2 times daily 50 g 3     budesonide-formoterol (SYMBICORT) 80-4.5 MCG/ACT Inhaler Inhale 2 puffs into the lungs 2 times daily 1 Inhaler 3     Calcium Carb-Cholecalciferol 600-800 MG-UNIT TABS Take 1 tablet orally once daily.       folic acid (FOLVITE) 1 MG tablet Take 1 tablet (1 mg) by mouth daily 30 tablet 11     glucosamine-chondroitin 500-400 MG CAPS per capsule Take 2 capsules by mouth       lisinopril (PRINIVIL/ZESTRIL) 10 MG tablet        meloxicam (MOBIC) 15 MG tablet Take 15 mg by  mouth daily       methotrexate sodium 2.5 MG TABS Take 8 tablets (20 mg) by mouth once a week 32 tablet 3     metoprolol tartrate (LOPRESSOR) 25 MG tablet        nitroGLYcerin (NITROSTAT) 0.4 MG sublingual tablet Place 0.4 mg under the tongue       Omega-3 Fatty Acids (FISH OIL PO) Take 1 capsule by mouth       omeprazole (PRILOSEC) 20 MG CR capsule        rosuvastatin (CRESTOR) 40 MG tablet Take 40 mg by mouth daily       triamterene-HCTZ (MAXZIDE-25) 37.5-25 MG tablet Take 1 tablet by mouth daily          No Known Allergies    Review of Systems:  -As per HPI  -Constitutional: Otherwise feeling well today, in usual state of health.  -HEENT: Patient denies nonhealing oral sores.  -Skin: As above in HPI. No additional skin concerns.    Physical exam:  Vitals: There were no vitals taken for this visit.  GEN: This is a well developed, well-nourished male in no acute distress, in a pleasant mood.    SKIN: Focused examination of the bilateral feet was performed.  -Brooks skin type: II-III  -Right plantar foot with defined violaceous patch and minimal scale extending from the arch to the mid plantar surface  -Left lateral foot with smaller similar-appearing patch, less scale compared to lesion on right foot  -No pain on palpation of entire right foot  -No other lesions of concern on areas examined.               Impression/Plan:  1. Cutaneous sarcoidosis  Well controlled on methotrexate with no evidence of active disease. Total cumulative dose ~1500 mg to date. Patient interested in trial off methotrexate, will continue using topical betamethasone. Also discussed alternative therapies including etanercept and adalimumab given patient-reported alcohol use sometimes up to 3 drinks a day.  Risk and benefits of these medications including immunosuppression and small risk of malignancy reviewed.  Patient denies any history of heart failure, last TTE with normal EF.  Patient last seen by pulmonology 2/15/2019, with plan  for repeat PFTs, however patient was lost to follow-up.  -Labs at [facility] next week   -Trial off methotrexate, continue folic acid for 4 more days  -Continue betamethasone 0.05% cream twice daily as needed  -If flare, patient to call us and resume taking methotrexate 20 mg weekly along with folic acid  -Importance of alcohol reduction discussed  -Consider adalimumab if flares off methotrexate  -We will CC pulmonology on this note for appropriate pulmonology follow-up as needed    2. R plantar foot pain   Low suspicion that this is related to cutaneous sarcoid given no induration on exam.  May be due to underlying nerve, bone, or other musculoskeletal abnormality.  -Follow-up with PCP    CC   Loreto Graham MD (Pulmonology) on close of this encounter.    Follow-up prn for new or changing lesions.    Dr. Gaitan staffed the patient.    Staff Involved:  Maria Antonia Manuel MD (PGY2)/Staff    Staff Physician Comments:   I saw and evaluated the patient with the resident and I agree with the assessment and plan.  I was present for the examination. I have made edits if needed.    Byron Gaitan MD  Staff Dermatologist and Dermatopathologist  , Department of Dermatology

## 2020-01-07 NOTE — PROGRESS NOTES
[facility] Dermatology Note      Dermatology Problem List:  1. 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 Benji notes his condition has been stable since last visit.        History of Present Illness:  Mr. Benji Fields is a 69 year old male who presents for follow-up of cutaneous sarcoidosis.  Patient was last seen on 7/30/2019 when he was continued on methotrexate 20 mg weekly with 1 mg folic acid on days not taking methotrexate.  He was also continued on betamethasone 0.05% ointment as needed under Saran wrap at night.    Labs including CBC, BMP, and Mg on 7/30/19 reviewed and reassuring.     Today, patient reports that he is continuing to take methotrexate 20 mg weekly as well as folic acid 1 mg every day including on methotrexate days.  He denies any tongue burning, nonhealing oral ulcers, difficulty breathing, fevers, chills, unintended weight loss, or GI distress.  He drinks alcohol, sometimes up to 3 beers, several times a week.    He applied betamethasone cream to his bilateral feet under occlusion for 1 week after his last appointment, but then stopped as he did not feel an (< 1x/week) improvement.     He notes that he continues to have some tenderness in his right foot that extends from a plaque on his plantar right foot up to the space between the first and the second digit.  He was unable to follow-up with his PCP regarding this issue as discussed during last appointment.  He also notes that the plaque on his right foot will sometimes  become irritated, this resolves with betamethasone cream use.     He has no other concerns today.    PFTs 2/15/19  Testing did not meet ATS criteria.   Moderate obstructive lung disease with significant bronchodilator response.   Normal diffusion and lung volumes, but RV is increased suggestive of air trapping.     Past Medical History:   Patient Active Problem List   Diagnosis     Cutaneous sarcoidosis     Past Medical History:   Diagnosis Date     High cholesterol      Hypertension      Past Surgical History:   Procedure Laterality Date     HERNIA REPAIR         Social History:  Patient reports that he has never smoked. He has never used smokeless tobacco.    Family History:  Family History   Problem Relation Age of Onset     Melanoma No family hx of      Skin Cancer No family hx of        Medications:  Current Outpatient Medications   Medication Sig Dispense Refill     acetaminophen (TYLENOL) 500 MG tablet Take 500-1,000 mg by mouth every 6 hours as needed for mild pain       aspirin 81 MG EC tablet Take 81 mg by mouth daily       B Complex Vitamins (VITAMIN B COMPLEX PO) Take 1 capsule by mouth       betamethasone dipropionate (DIPROSONE) 0.05 % ointment Apply topically 2 times daily 50 g 3     budesonide-formoterol (SYMBICORT) 80-4.5 MCG/ACT Inhaler Inhale 2 puffs into the lungs 2 times daily 1 Inhaler 3     Calcium Carb-Cholecalciferol 600-800 MG-UNIT TABS Take 1 tablet orally once daily.       folic acid (FOLVITE) 1 MG tablet Take 1 tablet (1 mg) by mouth daily 30 tablet 11     glucosamine-chondroitin 500-400 MG CAPS per capsule Take 2 capsules by mouth       lisinopril (PRINIVIL/ZESTRIL) 10 MG tablet        meloxicam (MOBIC) 15 MG tablet Take 15 mg by mouth daily       methotrexate sodium 2.5 MG TABS Take 8 tablets (20 mg) by mouth once a week 32 tablet 3     metoprolol tartrate (LOPRESSOR) 25 MG tablet        nitroGLYcerin (NITROSTAT) 0.4 MG sublingual tablet Place 0.4 mg under the tongue       Omega-3  Fatty Acids (FISH OIL PO) Take 1 capsule by mouth       omeprazole (PRILOSEC) 20 MG CR capsule        rosuvastatin (CRESTOR) 40 MG tablet Take 40 mg by mouth daily       triamterene-HCTZ (MAXZIDE-25) 37.5-25 MG tablet Take 1 tablet by mouth daily          No Known Allergies    Review of Systems:  -As per HPI  -Constitutional: Otherwise feeling well today, in usual state of health.  -HEENT: Patient denies nonhealing oral sores.  -Skin: As above in HPI. No additional skin concerns.    Physical exam:  Vitals: There were no vitals taken for this visit.  GEN: This is a well developed, well-nourished male in no acute distress, in a pleasant mood.    SKIN: Focused examination of the bilateral feet was performed.  -Brooks skin type: II-III  -Right plantar foot with defined violaceous patch and minimal scale extending from the arch to the mid plantar surface  -Left lateral foot with smaller similar-appearing patch, less scale compared to lesion on right foot  -No pain on palpation of entire right foot  -No other lesions of concern on areas examined.               Impression/Plan:  1. Cutaneous sarcoidosis  Well controlled on methotrexate with no evidence of active disease. Total cumulative dose ~1500 mg to date. Patient interested in trial off methotrexate, will continue using topical betamethasone. Also discussed alternative therapies including etanercept and adalimumab given patient-reported alcohol use sometimes up to 3 drinks a day.  Risk and benefits of these medications including immunosuppression and small risk of malignancy reviewed.  Patient denies any history of heart failure, last TTE with normal EF.  Patient last seen by pulmonology 2/15/2019, with plan for repeat PFTs, however patient was lost to follow-up.  -Labs at [facility] next week   -Trial off methotrexate, continue folic acid for 4 more days  -Continue betamethasone 0.05% cream twice daily as needed  -If flare, patient to call us and resume taking  methotrexate 20 mg weekly along with folic acid  -Importance of alcohol reduction discussed  -Consider adalimumab if flares off methotrexate  -We will CC pulmonology on this note for appropriate pulmonology follow-up as needed    2. R plantar foot pain   Low suspicion that this is related to cutaneous sarcoid given no induration on exam.  May be due to underlying nerve, bone, or other musculoskeletal abnormality.  -Follow-up with PCP    CC Loreto Graham MD (Pulmonology) on close of this encounter.    Follow-up prn for new or changing lesions.    Dr. Gaitan staffed the patient.    Staff Involved:  Maria Antonia Manuel MD ([id number])/Staff    Staff Physician Comments:   I saw and evaluated the patient with the resident and I agree with the assessment and plan.  I was present for the examination. I have made edits if needed.    Byron Gaitan MD  Staff Dermatologist and Dermatopathologist  , Department of Dermatology

## 2020-02-13 ENCOUNTER — TELEPHONE (OUTPATIENT)
Dept: PULMONOLOGY | Facility: CLINIC | Age: 70
End: 2020-02-13

## 2020-02-13 NOTE — TELEPHONE ENCOUNTER
Spoke to patient per Dr Graham request and he is not interested in an apt at this time. He has stopped his meds on the advise of Derm provider. He has clinic number to call to make apt when he decides he would like to come back. He was appreciative of the call to check if he wanted to make return apt.

## 2020-02-13 NOTE — TELEPHONE ENCOUNTER
----- Message from Loreto Graham MD sent at 2/12/2020  2:19 PM CST -----  Regarding: Clinic Appt  Nilay Black,    Can you see if Mr. Fields is interested in following up with pulmonary? I saw him about one year ago and had wanted him to followup but he cancelled his appointment due to surgery and it didn't get rescheduled.  If he's interested, he can followup in the next 2-3 months with PFTs and 6MWT.  Thanks,  Loreto

## (undated) RX ORDER — ALBUTEROL SULFATE 0.83 MG/ML
SOLUTION RESPIRATORY (INHALATION)
Status: DISPENSED
Start: 2019-02-15